# Patient Record
Sex: MALE | Race: WHITE | NOT HISPANIC OR LATINO | Employment: OTHER | URBAN - METROPOLITAN AREA
[De-identification: names, ages, dates, MRNs, and addresses within clinical notes are randomized per-mention and may not be internally consistent; named-entity substitution may affect disease eponyms.]

---

## 2018-03-22 ENCOUNTER — TRANSFERRED RECORDS (OUTPATIENT)
Dept: HEALTH INFORMATION MANAGEMENT | Facility: CLINIC | Age: 63
End: 2018-03-22

## 2022-05-11 ENCOUNTER — OFFICE VISIT (OUTPATIENT)
Dept: URGENT CARE | Facility: URGENT CARE | Age: 67
End: 2022-05-11
Payer: COMMERCIAL

## 2022-05-11 VITALS
WEIGHT: 195.31 LBS | TEMPERATURE: 98.5 F | DIASTOLIC BLOOD PRESSURE: 84 MMHG | SYSTOLIC BLOOD PRESSURE: 122 MMHG | HEART RATE: 77 BPM | OXYGEN SATURATION: 96 % | RESPIRATION RATE: 21 BRPM

## 2022-05-11 DIAGNOSIS — Z20.822 PERSON UNDER INVESTIGATION FOR COVID-19: Primary | ICD-10-CM

## 2022-05-11 DIAGNOSIS — J01.90 ACUTE NON-RECURRENT SINUSITIS, UNSPECIFIED LOCATION: ICD-10-CM

## 2022-05-11 LAB — SARS-COV-2 RNA RESP QL NAA+PROBE: NEGATIVE

## 2022-05-11 PROCEDURE — 99203 OFFICE O/P NEW LOW 30 MIN: CPT | Mod: CS | Performed by: PHYSICIAN ASSISTANT

## 2022-05-11 PROCEDURE — U0003 INFECTIOUS AGENT DETECTION BY NUCLEIC ACID (DNA OR RNA); SEVERE ACUTE RESPIRATORY SYNDROME CORONAVIRUS 2 (SARS-COV-2) (CORONAVIRUS DISEASE [COVID-19]), AMPLIFIED PROBE TECHNIQUE, MAKING USE OF HIGH THROUGHPUT TECHNOLOGIES AS DESCRIBED BY CMS-2020-01-R: HCPCS | Performed by: PHYSICIAN ASSISTANT

## 2022-05-11 PROCEDURE — U0005 INFEC AGEN DETEC AMPLI PROBE: HCPCS | Performed by: PHYSICIAN ASSISTANT

## 2022-05-11 RX ORDER — PRUCALOPRIDE 2 MG/1
1 TABLET, FILM COATED ORAL DAILY
COMMUNITY

## 2022-05-11 RX ORDER — BUPROPION HYDROCHLORIDE 150 MG/1
TABLET ORAL
COMMUNITY
Start: 2021-10-07

## 2022-05-11 RX ORDER — ALBUTEROL SULFATE 90 UG/1
2 AEROSOL, METERED RESPIRATORY (INHALATION)
COMMUNITY
Start: 2022-02-02

## 2022-05-11 RX ORDER — LANOLIN ALCOHOL/MO/W.PET/CERES
100 CREAM (GRAM) TOPICAL
COMMUNITY

## 2022-05-11 RX ORDER — LEVOTHYROXINE SODIUM 137 UG/1
1 TABLET ORAL DAILY
COMMUNITY
Start: 2021-10-18

## 2022-05-11 RX ORDER — LAMOTRIGINE 100 MG/1
TABLET ORAL
COMMUNITY
Start: 2022-02-14

## 2022-05-11 RX ORDER — BUSPIRONE HYDROCHLORIDE 10 MG/1
10 TABLET ORAL
COMMUNITY
Start: 2022-03-07 | End: 2023-03-07

## 2022-05-11 RX ORDER — CHLORAL HYDRATE 500 MG
2 CAPSULE ORAL
COMMUNITY

## 2022-05-11 RX ORDER — ROSUVASTATIN CALCIUM 5 MG/1
1 TABLET, COATED ORAL DAILY
COMMUNITY
Start: 2021-09-22

## 2022-05-11 RX ORDER — DULOXETIN HYDROCHLORIDE 60 MG/1
2 CAPSULE, DELAYED RELEASE ORAL
COMMUNITY
End: 2022-05-11

## 2022-05-11 RX ORDER — LOSARTAN POTASSIUM 25 MG/1
1 TABLET ORAL DAILY
COMMUNITY
Start: 2021-08-11

## 2022-05-11 RX ORDER — HYDROCHLOROTHIAZIDE 25 MG/1
0.5 TABLET ORAL DAILY
COMMUNITY
Start: 2021-09-10

## 2022-05-11 RX ORDER — GABAPENTIN 300 MG/1
1500 CAPSULE ORAL
COMMUNITY
Start: 2020-06-01

## 2022-05-11 RX ORDER — SILDENAFIL CITRATE 20 MG/1
TABLET ORAL
COMMUNITY
Start: 2022-01-18

## 2022-05-11 RX ORDER — TERBUTALINE SULFATE 5 MG/1
TABLET ORAL
COMMUNITY
Start: 2021-10-27

## 2022-05-11 RX ORDER — VILAZODONE HYDROCHLORIDE 40 MG/1
40 TABLET ORAL
COMMUNITY
Start: 2020-06-01

## 2022-05-11 RX ORDER — DILTIAZEM HYDROCHLORIDE 120 MG/1
CAPSULE, EXTENDED RELEASE ORAL
COMMUNITY
Start: 2021-08-30

## 2022-05-11 NOTE — PATIENT INSTRUCTIONS
1.  Plenty of fluids, rest, warm compresses on face  2.  Mucinex twice daily for at least 4 days  3.  Madeleine Pot 1x in the morning 1x at night (SALINE MIST SPRAY IS AN ACCEPTABLE, THOUGH NOT AS EFFECTIVE REPLACEMENT)  4.  Benadryl (diphenhydramine) at bedtime   5.  Either Claritin (Loratadine), Allegra (Fexofenadine), or Zyrtec (Cetirizine) in the day  6.  Flonase (Fluticasone) 2x each nostril twice a day for two weeks, then once each nostril once a day  7. Antibiotic twice daily for 7 Days  8. Probiotic (ie Culturelle) 1-2 hours after each antibiotic dose     Please let us know if symptoms persist, or worsen.      Patient Education     Self-Care for Sinusitis     Drinking plenty of water can help sinuses drain.   Sinusitis can often be managed with self-care. Self-care can keep sinuses moist and make you feel more comfortable. Remember to follow your doctor's instructions closely. This can make a big difference in getting your sinus problem under control.  Drink fluids  Drinking extra fluids helps thin your mucus. This lets it drain from your sinuses more easily. Have a glass of water every hour or two. A humidifier helps in much the same way. Fluids can also offset the drying effects of certain medicines. If you use a humidifier, follow the product maker's instructions on how to use it. Clean it on a regular schedule.  Use saltwater rinses  Rinses help keep your sinuses and nose moist. Mix a teaspoon of salt in 8 ounces of fresh, warm water. Use a bulb syringe to gently squirt the water into your nose a few times a day. You can also buy ready-made saline nasal sprays.  Apply hot or cold packs  Applying heat to the area surrounding your sinuses may make you feel more comfortable. Use a hot water bottle or a hand towel dipped in hot water. Some people also find ice packs effective for relieving pain.  Medicines  Your doctor may prescribe medications to help treat your sinusitis. If you have an infection,  antibiotics can help clear it up. If you are prescribed antibiotics, take all pills on schedule until they are gone, even if you feel better. Decongestants help relieve swelling. Use decongestant sprays for short periods only under the direction of your doctor. If you have allergies, your doctor may prescribe medications to help relieve them.   Date Last Reviewed: 10/1/2016    3761-7080 [employer]. [address] [place] [address], [place] [address]. All rights reserved. This information is not intended as a substitute for professional medical care. Always follow your healthcare professional's instructions.        Follow up immediately with severe headache, chest pain, or shortness of breath    Rest, isolate for results, hydrate, follow up if worsening or new symptoms  Unvaccinated household members to isolate until test results, if positive isolate for 2 weeks and follow up for testing if symptoms occur         Patient Education     Coronavirus Disease 2019 (COVID-19): Caring for Yourself or Others   If you or a household member have symptoms of COVID-19, follow the guidelines below. This will help you manage symptoms and keep the virus from spreading.  If you have symptoms of COVID-19  Stay home and contact your care team. They will tell you what to do.  Don t panic. Keep in mind that other illnesses can cause similar symptoms.  Stay away from work, school, and public places.  Limit physical contact with others in your home. Limit visitors. No kissing.  Clean surfaces you touch with disinfectant.  If you need to cough or sneeze, do it into a tissue. Then throw the tissue into the trash. If you don't have tissues, cough or sneeze into the bend of your elbow.  Don t share food or personal items with people in your household. This includes items like eating and drinking utensils, towels, and bedding.  Wear a cloth face mask around other people. During a public health emergency, medical face masks may be reserved  for healthcare workers. You may need to make a cloth face mask of your own. You can do this using a bandana, T-shirt, or other cloth. The [facility] has instructions on how to make a face mask. Wear the mask so that it covers both your nose and mouth.  If you need to go to a hospital or clinic, call ahead to let them know. Expect the care team to wear masks, gowns, gloves, and eye protection. You may need to come to a different entrance or wait in a separate room.  Follow all instructions from your care team.    If you ve been diagnosed with COVID-19  Stay home and away from others, including other people in your home. (This is called self-isolation.) Don t leave home unless you need to get medical care. Don t go to work, school, or public places. Don t use buses, taxis, or other public transportation.  Follow all instructions from your care team.  If you need to go to a hospital or clinic, call ahead to let them know. Expect the care team to wear masks, gowns, gloves, and eye protection. You may need to come to a different entrance or wait in a separate room.  Wear a face mask over your nose and mouth. This is to protect others from your germs. If you can t wear a mask, your caregivers should wear one. You may need to make your own mask using a bandana, T-shirt, or other cloth. See the CDC s instructions on how to make a face mask.  Have no contact with pets and other animals.  Don t share food or personal items with people in your household. This includes items like eating and drinking utensils, towels, and bedding.  If you need to cough or sneeze, do it into a tissue. Then throw the tissue into the trash. If you don't have tissues, cough or sneeze into the bend of your elbow.  Wash your hands often.    Self-care at home   At this time, there is no medicine approved to prevent or treat COVID-19. The FDA has approved an antiviral medicine (called remdesivir) for people being treated in the hospital. This is for people 12  years and older who weigh more than about 88 pounds (40 kgs). In certain cases, it may also be used for people younger than 12 years or who weigh less than about 88 pounds (40 kgs)..  Currently, treatment is mostly aimed at helping your body while it fights the virus.  Getting extra rest.  Drink extra fluids 6 to 8 glasses of liquids each day), unless a doctor has told you not to. Ask your care team which fluids are best for you. Avoid fluids that contain caffeine or alcohol.  Taking over-the-counter (OTC) medicine to reduce pain and fever. Your care team will tell you which medicine to use.  If you ve been in the hospital for COVID-19, follow your care team s instructions. They will tell you when to stop self-isolation. They may also have you change positions to help your breathing (such as lying on your belly).  If a test showed that you have COVID-19, you may be asked to donate plasma after you ve recovered. (This is called COVID-19 convalescent plasma donation.) The plasma may contain antibodies to help fight the virus in others. Experts don't know if the plasma will work well as a treatment. Research continues, and the FDA has approved it for emergency use in certain people with serious or life-threatening COVID-19. For more information, talk to your care team.  Caring for a sick person   Follow all instructions from the care team.  Wash your hands often.  Wear protective clothing as advised.  Make sure the sick person wears a mask. If they can't wear a mask, don t stay in the same room with them. If you must be in the same room, wear a face mask. Make sure the mask covers both the nose and mouth.  Keep track of the sick person s symptoms.  Clean surfaces often with disinfectant. This includes phones, kitchen counters, fridge door handles, bathroom surfaces, and others.  Don t let anyone share household items with the sick person. This includes eating and drinking tools, towels, sheets, or blankets.  Clean  fabrics and laundry well.  Keep other people and pets away from the sick person.    When you can stop self-isolation  When you are sick with COVID-19, you should stay away from other people. This is called self-isolation. The rules for ending self-isolation depend on your health in general.  If you are normally healthy:  You can stop self-isolation when all 3 of these are true:  You ve had no fever--and no medicine that reduces fever--for at least 24 hours. And   Your symptoms are better, such as cough or trouble breathing. And   At least 10 days have passed since your symptoms first started.  Talk with your care team before you leave home. They may tell you it s okay to leave, or they may give you different advice. You do not need to be re-tested.  If you have a weak immune system, or you ve had severe COVID-19:  Follow your care team s instructions. You may be asked to self-isolate for 10 days to 20 days after your symptoms first started. Your care team may want to re-test you for COVID-19.  Note: If you re being treated for cancer, have an immune disorder (such as HIV), or have had a transplant (organ or bone marrow), you may have a weak immune system.  If you've already had COVID-19 once:  If you had the virus over 3 months ago, and you ve been exposed again, treat it like you've never had COVID-19. Stay home, limit your contact with others, call your care team, and watch for symptoms.  If it s been less than 3 months since you had the virus, you re symptom-free, and you ve been exposed again: You don t need to self-isolate or be re-tested. But if you develop new symptoms that can t be linked to another illness, please self-isolate and contact your care team.  When you return to public settings  When you are well enough to go outside your home, follow the CDC s guidance on cloth face masks.  Anyone over age 2 should wear a face mask in public, especially when it's hard to socially distance. This includes public  transit, public protests and marches, and crowded stores, bars, and restaurants.  Face masks are most likely to reduce the spread of COVID-19 when they are widely used by people who are out in the public.  Certain people should not wear a face covering. These include:  Children under 2 years old  Anyone with a health, developmental, or mental health condition that can be made worse by wearing a mask  Anyone who is unconscious or unable to remove the face covering without help. See the CDC's guidance on who should not wear a face mask.  When to call your care team  Call your care team right away if a sick person has any of these:  Trouble breathing  Pain or pressure in chest  If a sick person has any of these, call 911:  Trouble breathing that gets worse  Pain or pressure in chest that gets worse  Blue tint to lips or face  Fast or irregular heartbeat  Confusion or trouble waking  Fainting or loss of consciousness  Coughing up blood  Going home from the hospital   If you have COVID-19 and were recently in the hospital:  Follow the instructions above for self-care and isolation.  Follow the hospital care team s instructions.  Ask questions if anything is unclear to you. Write down answers so you remember them.  Date last modified: 11/23/2020  Khadra last reviewed this educational content on 4/1/2020  This information has been modified by your health care provider with permission from the publisher.    [id number] [employer]. [address] [place] [address], [place] [address]. All rights reserved. This information is not intended as a substitute for professional medical care. Always follow your healthcare professional's instructions.

## 2022-05-11 NOTE — PROGRESS NOTES
SUBJECTIVE:  Alton Bronson is a 67 year old male here with concerns about sinus infection.  He states onset of symptoms were 1 day(s) ago.  He has had maxillary pressure. Course of illness is same. Severity moderate  Current and Associated symptoms: facial pain/pressure, tooth pain and headache  Predisposing factors include HX of recurrent sinusitis.   History of siinus surgery and sinusities      No past medical history on file.  Social History     Tobacco Use     Smoking status: Not on file     Smokeless tobacco: Not on file   Substance Use Topics     Alcohol use: Not on file       ROS:  Review of systems negative except as stated above.    OBJECTIVE:  /84 (BP Location: Right arm, Patient Position: Sitting, Cuff Size: Adult Regular)   Pulse 77   Temp 98.5  F (36.9  C) (Tympanic)   Resp 21   Wt 88.6 kg (195 lb 5 oz)   SpO2 96%   Exam:GENERAL APPEARANCE: healthy, alert and no distress  EYES: EOMI,  PERRL, conjunctiva clear  HENT: ear canals and TM's normal.  Nose and mouth without ulcers, erythema or lesions  NECK: supple, nontender, no lymphadenopathy  RESP: lungs clear to auscultation - no rales, rhonchi or wheezes  CV: regular rates and rhythm, normal S1 S2, no murmur noted  ABDOMEN:  soft, nontender, no HSM or masses and bowel sounds normal  NEURO: Normal strength and tone, sensory exam grossly normal,  normal speech and mentation  SKIN: no suspicious lesions or rashes      Results for orders placed or performed in visit on 05/11/22   Symptomatic; Yes; 5/11/2022 COVID-19 Virus (Coronavirus) by PCR Nose     Status: Normal    Specimen: Nose; Swab   Result Value Ref Range    SARS CoV2 PCR Negative Negative, Testing sent to reference lab. Results will be returned via unsolicited result    Narrative    Testing was performed using the Aptima SARS-CoV-2 Assay on the  [employer] [place] Instrument System. Additional information about this  Emergency Use Authorization (EUA) assay can be found via the Lab  Guide.  This test should be ordered for the detection of SARS-CoV-2 in  individuals who meet SARS-CoV-2 clinical and/or epidemiological  criteria. Test performance is unknown in asymptomatic patients. This  test is for in vitro diagnostic use under the FDA EUA for  laboratories certified under CLIA to perform high complexity testing.  This test has not been FDA cleared or approved. A negative result  does not rule out the presence of PCR inhibitors in the specimen or  target RNA in concentration below the limit of detection for the  assay. The possibility of a false negative should be considered if  the patient's recent exposure or clinical presentation suggests  COVID-19. This test was validated by the [facility]. This laboratory is certified under  the Clinical Laboratory Improvement Amendments of 1988 (CLIA-88) as  qualified to perform high complexity laboratory testing.       ASSESSMENT:  (Z20.822) Person under investigation for COVID-19  (primary encounter diagnosis)  (J01.90) Acute non-recurrent sinusitis, unspecified location  Plan: Symptomatic; Yes; 5/11/2022 COVID-19 Virus         (Coronavirus) by PCR Nose,         amoxicillin-clavulanate (AUGMENTIN) 875-125 MG         tablet      Covid-19  Pt was evaluated during a global COVID-19 pandemic, which necessitated consideration that the patient might be at risk for infection with the SARS-CoV-2 virus that causes COVID-19.   Applicable protocols for evaluation were followed during the patient's care.   COVID-19 was considered as part of the patient's evaluation. The plan for testing is:  a test was ordered during this visit.    No severe headache, chest pain, shortness of breath  No additional infectious symptoms  Rest, isolate for results, hydrate, follow up if worsening or new symptoms  Unvaccinated HH members to isolate until test results, if positive isolate for 2 weeks and follow up for testing if symptoms  occur  Red flags and emergent follow up discussed, and understood by patient  Follow up with PCP if symptoms worsen or fail to improve    Surgical mask, shield, gloves worn by provider      Patient Instructions         1.  Plenty of fluids, rest, warm compresses on face  2.  Mucinex twice daily for at least 4 days  3.  Madeleine Pot 1x in the morning 1x at night (SALINE MIST SPRAY IS AN ACCEPTABLE, THOUGH NOT AS EFFECTIVE REPLACEMENT)  4.  Benadryl (diphenhydramine) at bedtime   5.  Either Claritin (Loratadine), Allegra (Fexofenadine), or Zyrtec (Cetirizine) in the day  6.  Flonase (Fluticasone) 2x each nostril twice a day for two weeks, then once each nostril once a day  7. Antibiotic twice daily for 7 Days  8. Probiotic (ie Culturelle) 1-2 hours after each antibiotic dose     Please let us know if symptoms persist, or worsen.      Patient Education     Self-Care for Sinusitis     Drinking plenty of water can help sinuses drain.   Sinusitis can often be managed with self-care. Self-care can keep sinuses moist and make you feel more comfortable. Remember to follow your doctor's instructions closely. This can make a big difference in getting your sinus problem under control.  Drink fluids  Drinking extra fluids helps thin your mucus. This lets it drain from your sinuses more easily. Have a glass of water every hour or two. A humidifier helps in much the same way. Fluids can also offset the drying effects of certain medicines. If you use a humidifier, follow the product maker's instructions on how to use it. Clean it on a regular schedule.  Use saltwater rinses  Rinses help keep your sinuses and nose moist. Mix a teaspoon of salt in 8 ounces of fresh, warm water. Use a bulb syringe to gently squirt the water into your nose a few times a day. You can also buy ready-made saline nasal sprays.  Apply hot or cold packs  Applying heat to the area surrounding your sinuses may make you feel more comfortable. Use a hot water  bottle or a hand towel dipped in hot water. Some people also find ice packs effective for relieving pain.  Medicines  Your doctor may prescribe medications to help treat your sinusitis. If you have an infection, antibiotics can help clear it up. If you are prescribed antibiotics, take all pills on schedule until they are gone, even if you feel better. Decongestants help relieve swelling. Use decongestant sprays for short periods only under the direction of your doctor. If you have allergies, your doctor may prescribe medications to help relieve them.   Date Last Reviewed: 10/1/2016    0223-0296 [employer]. [address] [place] [address], [place] [address]. All rights reserved. This information is not intended as a substitute for professional medical care. Always follow your healthcare professional's instructions.        Follow up immediately with severe headache, chest pain, or shortness of breath    Rest, isolate for results, hydrate, follow up if worsening or new symptoms  Unvaccinated household members to isolate until test results, if positive isolate for 2 weeks and follow up for testing if symptoms occur         Patient Education     Coronavirus Disease 2019 (COVID-19): Caring for Yourself or Others   If you or a household member have symptoms of COVID-19, follow the guidelines below. This will help you manage symptoms and keep the virus from spreading.  If you have symptoms of COVID-19    Stay home and contact your care team. They will tell you what to do.    Don t panic. Keep in mind that other illnesses can cause similar symptoms.    Stay away from work, school, and public places.    Limit physical contact with others in your home. Limit visitors. No kissing.  Clean surfaces you touch with disinfectant.  If you need to cough or sneeze, do it into a tissue. Then throw the tissue into the trash. If you don't have tissues, cough or sneeze into the bend of your elbow.  Don t share food or personal items  with people in your household. This includes items like eating and drinking utensils, towels, and bedding.  Wear a cloth face mask around other people. During a public health emergency, medical face masks may be reserved for healthcare workers. You may need to make a cloth face mask of your own. You can do this using a bandana, T-shirt, or other cloth. The [facility] has instructions on how to make a face mask. Wear the mask so that it covers both your nose and mouth.  If you need to go to a hospital or clinic, call ahead to let them know. Expect the care team to wear masks, gowns, gloves, and eye protection. You may need to come to a different entrance or wait in a separate room.  Follow all instructions from your care team.    If you ve been diagnosed with COVID-19    Stay home and away from others, including other people in your home. (This is called self-isolation.) Don t leave home unless you need to get medical care. Don t go to work, school, or public places. Don t use buses, taxis, or other public transportation.    Follow all instructions from your care team.    If you need to go to a hospital or clinic, call ahead to let them know. Expect the care team to wear masks, gowns, gloves, and eye protection. You may need to come to a different entrance or wait in a separate room.    Wear a face mask over your nose and mouth. This is to protect others from your germs. If you can t wear a mask, your caregivers should wear one. You may need to make your own mask using a bandana, T-shirt, or other cloth. See the [facility] s instructions on how to make a face mask.    Have no contact with pets and other animals.    Don t share food or personal items with people in your household. This includes items like eating and drinking utensils, towels, and bedding.    If you need to cough or sneeze, do it into a tissue. Then throw the tissue into the trash. If you don't have tissues, cough or sneeze into the bend of your elbow.    Wash your  hands often.    Self-care at home   At this time, there is no medicine approved to prevent or treat COVID-19. The FDA has approved an antiviral medicine (called remdesivir) for people being treated in the hospital. This is for people 12 years and older who weigh more than about 88 pounds (40 kgs). In certain cases, it may also be used for people younger than 12 years or who weigh less than about 88 pounds (40 kgs)..  Currently, treatment is mostly aimed at helping your body while it fights the virus.    Getting extra rest.    Drink extra fluids 6 to 8 glasses of liquids each day), unless a doctor has told you not to. Ask your care team which fluids are best for you. Avoid fluids that contain caffeine or alcohol.    Taking over-the-counter (OTC) medicine to reduce pain and fever. Your care team will tell you which medicine to use.  If you ve been in the hospital for COVID-19, follow your care team s instructions. They will tell you when to stop self-isolation. They may also have you change positions to help your breathing (such as lying on your belly).  If a test showed that you have COVID-19, you may be asked to donate plasma after you ve recovered. (This is called COVID-19 convalescent plasma donation.) The plasma may contain antibodies to help fight the virus in others. Experts don't know if the plasma will work well as a treatment. Research continues, and the FDA has approved it for emergency use in certain people with serious or life-threatening COVID-19. For more information, talk to your care team.  Caring for a sick person     Follow all instructions from the care team.    Wash your hands often.    Wear protective clothing as advised.    Make sure the sick person wears a mask. If they can't wear a mask, don t stay in the same room with them. If you must be in the same room, wear a face mask. Make sure the mask covers both the nose and mouth.    Keep track of the sick person s symptoms.    Clean surfaces often  with disinfectant. This includes phones, kitchen counters, fridge door handles, bathroom surfaces, and others.    Don t let anyone share household items with the sick person. This includes eating and drinking tools, towels, sheets, or blankets.    Clean fabrics and laundry well.    Keep other people and pets away from the sick person.    When you can stop self-isolation  When you are sick with COVID-19, you should stay away from other people. This is called self-isolation. The rules for ending self-isolation depend on your health in general.  If you are normally healthy:  You can stop self-isolation when all 3 of these are true:    You ve had no fever--and no medicine that reduces fever--for at least 24 hours. And     Your symptoms are better, such as cough or trouble breathing. And     At least 10 days have passed since your symptoms first started.  Talk with your care team before you leave home. They may tell you it s okay to leave, or they may give you different advice. You do not need to be re-tested.  If you have a weak immune system, or you ve had severe COVID-19:  Follow your care team s instructions. You may be asked to self-isolate for 10 days to 20 days after your symptoms first started. Your care team may want to re-test you for COVID-19.  Note: If you re being treated for cancer, have an immune disorder (such as HIV), or have had a transplant (organ or bone marrow), you may have a weak immune system.  If you've already had COVID-19 once:  If you had the virus over 3 months ago, and you ve been exposed again, treat it like you've never had COVID-19. Stay home, limit your contact with others, call your care team, and watch for symptoms.  If it s been less than 3 months since you had the virus, you re symptom-free, and you ve been exposed again: You don t need to self-isolate or be re-tested. But if you develop new symptoms that can t be linked to another illness, please self-isolate and contact your care  team.  When you return to public settings  When you are well enough to go outside your home, follow the CDC s guidance on cloth face masks.    Anyone over age 2 should wear a face mask in public, especially when it's hard to socially distance. This includes public transit, public protests and marches, and crowded stores, bars, and restaurants.    Face masks are most likely to reduce the spread of COVID-19 when they are widely used by people who are out in the public.  Certain people should not wear a face covering. These include:    Children under 2 years old    Anyone with a health, developmental, or mental health condition that can be made worse by wearing a mask    Anyone who is unconscious or unable to remove the face covering without help. See the CDC's guidance on who should not wear a face mask.  When to call your care team  Call your care team right away if a sick person has any of these:    Trouble breathing    Pain or pressure in chest  If a sick person has any of these, call 911:    Trouble breathing that gets worse    Pain or pressure in chest that gets worse    Blue tint to lips or face    Fast or irregular heartbeat    Confusion or trouble waking    Fainting or loss of consciousness    Coughing up blood  Going home from the hospital   If you have COVID-19 and were recently in the hospital:    Follow the instructions above for self-care and isolation.    Follow the hospital care team s instructions.    Ask questions if anything is unclear to you. Write down answers so you remember them.  Date last modified: 11/23/2020  Khadra last reviewed this educational content on 4/1/2020  This information has been modified by your health care provider with permission from the publisher.    2333-9499 [employer]. [address] [place] [address], [place] [address]. All rights reserved. This information is not intended as a substitute for professional medical care. Always follow your healthcare professional's  instructions.

## 2022-11-23 RX ORDER — FUROSEMIDE 20 MG
20 TABLET ORAL DAILY
COMMUNITY

## 2022-11-23 RX ORDER — SENNOSIDES A AND B 8.6 MG/1
1 TABLET, FILM COATED ORAL DAILY
COMMUNITY

## 2022-11-23 RX ORDER — OMEGA-3 FATTY ACIDS/FISH OIL 300-1000MG
200 CAPSULE ORAL EVERY 4 HOURS PRN
Status: ON HOLD | COMMUNITY
End: 2022-11-28

## 2022-11-23 RX ORDER — TADALAFIL 5 MG/1
5 TABLET ORAL EVERY 24 HOURS
COMMUNITY

## 2022-11-23 RX ORDER — FAMOTIDINE 20 MG/1
20 TABLET, FILM COATED ORAL 2 TIMES DAILY
COMMUNITY

## 2022-11-27 ENCOUNTER — ANESTHESIA EVENT (OUTPATIENT)
Dept: SURGERY | Facility: CLINIC | Age: 67
End: 2022-11-27
Payer: COMMERCIAL

## 2022-11-27 ASSESSMENT — LIFESTYLE VARIABLES: TOBACCO_USE: 1

## 2022-11-27 NOTE — ANESTHESIA PREPROCEDURE EVALUATION
Anesthesia Pre-Procedure Evaluation    Patient: Alton Bronson   MRN: [id number] : 1955        Procedure : Procedure(s):  RIGHT FIRST METATARSOPHALANGEAL JOINT ARTHRODESIS,  RIGHT BUNIONETTE REPAIR          Past Medical History:   Diagnosis Date     ADD (attention deficit disorder)      Anxiety      Depression      Gastroesophageal reflux disease with esophagitis      Gastroparesis      Hypertension      Osteoarthritis      Pleural effusion      Uncomplicated asthma       Past Surgical History:   Procedure Laterality Date     GASTRIC FUNDOPLICATION       HERNIA REPAIR       SEPTOPLASTY       TONSILLECTOMY        Allergies   Allergen Reactions     Lisinopril      Allergist suggested lisinopril might be adding to his cough every2018.  Started losartan in its place but could try lisinopril again in the future as a trial if desired.     Simvastatin      PN: LW Reaction: myalgias at 80mg      Social History     Tobacco Use     Smoking status: Not on file     Smokeless tobacco: Not on file   Substance Use Topics     Alcohol use: Not on file      Wt Readings from Last 1 Encounters:   22 88.6 kg (195 lb 5 oz)        Anesthesia Evaluation   Pt has had prior anesthetic.     No history of anesthetic complications       ROS/MED HX  ENT/Pulmonary: Comment: S/p left pleurodesis for pleural effusions    Chronic pansinusitis    Baseline oxygen saturations are 92% on room air.    (+) tobacco use, asthma     Neurologic: Comment: ADD      Cardiovascular: Comment:  Stress Test  STRESS ECHOCARDIOGRAM.   Date: 2014 Start: 09:09 AM Facility: [facility]     CONCLUSIONS   REST:   LV chamber size, wall thickness, and segmental and global wall motion   are normal. No significant valvular abnormalities are seen.   The visually estimated resting LVEF is 60 %. Size of the aortic sinus   valsalva is slightly increased. (3.9 cm)     STRESS:   All segments display appropriate hyperkinesis; ejection fraction   increases  appropriately.   End systolic area decreases.     CONCLUSIONS:   Normal exercise echocardiogram with adequate heart rate and workload.   No evidence for inducible ischemia.   Risk stratification by stress echocardiography is identified as low   risk based on normal stress echocardiogram.      (+) Dyslipidemia hypertension-----    METS/Exercise Tolerance:     Hematologic: Comments: Hgb 13.8/Plt 388      Musculoskeletal:   (+) arthritis,     GI/Hepatic: Comment: H/o gastroparesis    S/p nissen    H/o diverticular disease        (+) GERD,     Renal/Genitourinary:  - neg Renal ROS     Endo:     (+) thyroid problem, hypothyroidism,     Psychiatric/Substance Use:     (+) psychiatric history anxiety and depression     Infectious Disease:  - neg infectious disease ROS     Malignancy:  - neg malignancy ROS     Other:  - neg other ROS          Physical Exam    Airway  airway exam normal      Mallampati: II   TM distance: > 3 FB   Neck ROM: full   Mouth opening: > 3 cm    Respiratory Devices and Support         Dental  no notable dental history         Cardiovascular   cardiovascular exam normal          Pulmonary   pulmonary exam normal                OUTSIDE LABS:  CBC: No results found for: WBC, HGB, HCT, PLT  BMP: No results found for: NA, POTASSIUM, CHLORIDE, CO2, BUN, CR, GLC  COAGS: No results found for: PTT, INR, FIBR  POC: No results found for: BGM, HCG, HCGS  HEPATIC: No results found for: ALBUMIN, PROTTOTAL, ALT, AST, GGT, ALKPHOS, BILITOTAL, BILIDIRECT, RODRIGUEZ  OTHER: No results found for: PH, LACT, A1C, MICHAEL, PHOS, MAG, LIPASE, AMYLASE, TSH, T4, T3, CRP, SED    Anesthesia Plan    ASA Status:  3      Anesthesia Type: General.     - Airway: ETT   Induction: Intravenous.   Maintenance: Balanced.   Techniques and Equipment:     - Airway: Video-Laryngoscope         Consents    Anesthesia Plan(s) and associated risks, benefits, and realistic alternatives discussed. Questions answered and patient/representative(s)  expressed understanding.    - Discussed:     - Discussed with:  Patient         Postoperative Care    Pain management: IV analgesics, Peripheral nerve block (Continuous), Multi-modal analgesia.   PONV prophylaxis: Ondansetron (or other 5HT-3), Dexamethasone or Solumedrol     Comments:    Other Comments: Discussed risks/benefits of popliteal/adductor nerve block with patient. Patient would like blocks for post-op pain.            Antonio Luciano MD

## 2022-11-28 ENCOUNTER — APPOINTMENT (OUTPATIENT)
Dept: GENERAL RADIOLOGY | Facility: CLINIC | Age: 67
End: 2022-11-28
Attending: ORTHOPAEDIC SURGERY
Payer: COMMERCIAL

## 2022-11-28 ENCOUNTER — HOSPITAL ENCOUNTER (OUTPATIENT)
Facility: CLINIC | Age: 67
Discharge: HOME | End: 2022-11-28
Attending: ORTHOPAEDIC SURGERY | Admitting: ORTHOPAEDIC SURGERY
Payer: COMMERCIAL

## 2022-11-28 ENCOUNTER — ANESTHESIA (OUTPATIENT)
Dept: SURGERY | Facility: CLINIC | Age: 67
End: 2022-11-28
Payer: COMMERCIAL

## 2022-11-28 VITALS
WEIGHT: 191.6 LBS | DIASTOLIC BLOOD PRESSURE: 96 MMHG | SYSTOLIC BLOOD PRESSURE: 132 MMHG | OXYGEN SATURATION: 93 % | HEART RATE: 88 BPM | TEMPERATURE: 97 F | RESPIRATION RATE: 12 BRPM | BODY MASS INDEX: 25.39 KG/M2 | HEIGHT: 73 IN

## 2022-11-28 DIAGNOSIS — M20.21 HALLUX RIGIDUS OF RIGHT FOOT: Primary | ICD-10-CM

## 2022-11-28 LAB — POTASSIUM BLD-SCNC: 3.5 MMOL/L (ref 3.4–5.3)

## 2022-11-28 PROCEDURE — 250N000013 HC RX MED GY IP 250 OP 250 PS 637: Performed by: PHYSICIAN ASSISTANT

## 2022-11-28 PROCEDURE — 250N000011 HC RX IP 250 OP 636: Performed by: ANESTHESIOLOGY

## 2022-11-28 PROCEDURE — 999N000141 HC STATISTIC PRE-PROCEDURE NURSING ASSESSMENT: Performed by: ORTHOPAEDIC SURGERY

## 2022-11-28 PROCEDURE — 250N000009 HC RX 250: Performed by: ORTHOPAEDIC SURGERY

## 2022-11-28 PROCEDURE — 250N000011 HC RX IP 250 OP 636: Performed by: NURSE ANESTHETIST, CERTIFIED REGISTERED

## 2022-11-28 PROCEDURE — 360N000083 HC SURGERY LEVEL 3 W/ FLUORO, PER MIN: Performed by: ORTHOPAEDIC SURGERY

## 2022-11-28 PROCEDURE — 258N000003 HC RX IP 258 OP 636: Performed by: NURSE ANESTHETIST, CERTIFIED REGISTERED

## 2022-11-28 PROCEDURE — 84132 ASSAY OF SERUM POTASSIUM: CPT | Performed by: ANESTHESIOLOGY

## 2022-11-28 PROCEDURE — 272N000001 HC OR GENERAL SUPPLY STERILE: Performed by: ORTHOPAEDIC SURGERY

## 2022-11-28 PROCEDURE — 250N000011 HC RX IP 250 OP 636: Performed by: PHYSICIAN ASSISTANT

## 2022-11-28 PROCEDURE — 258N000003 HC RX IP 258 OP 636: Performed by: ANESTHESIOLOGY

## 2022-11-28 PROCEDURE — 710N000012 HC RECOVERY PHASE 2, PER MINUTE: Performed by: ORTHOPAEDIC SURGERY

## 2022-11-28 PROCEDURE — 271N000001 HC OR GENERAL SUPPLY NON-STERILE: Performed by: ORTHOPAEDIC SURGERY

## 2022-11-28 PROCEDURE — 710N000009 HC RECOVERY PHASE 1, LEVEL 1, PER MIN: Performed by: ORTHOPAEDIC SURGERY

## 2022-11-28 PROCEDURE — 999N000179 XR SURGERY CARM FLUORO LESS THAN 5 MIN W STILLS

## 2022-11-28 PROCEDURE — 250N000009 HC RX 250: Performed by: NURSE ANESTHETIST, CERTIFIED REGISTERED

## 2022-11-28 PROCEDURE — 370N000017 HC ANESTHESIA TECHNICAL FEE, PER MIN: Performed by: ORTHOPAEDIC SURGERY

## 2022-11-28 PROCEDURE — 250N000025 HC SEVOFLURANE, PER MIN: Performed by: ORTHOPAEDIC SURGERY

## 2022-11-28 PROCEDURE — C1713 ANCHOR/SCREW BN/BN,TIS/BN: HCPCS | Performed by: ORTHOPAEDIC SURGERY

## 2022-11-28 DEVICE — IMPLANTABLE DEVICE: Type: IMPLANTABLE DEVICE | Site: FOOT | Status: FUNCTIONAL

## 2022-11-28 RX ORDER — HYDROCODONE BITARTRATE AND ACETAMINOPHEN 5; 325 MG/1; MG/1
1-2 TABLET ORAL EVERY 4 HOURS PRN
Qty: 30 TABLET | Refills: 0 | Status: SHIPPED | OUTPATIENT
Start: 2022-11-28

## 2022-11-28 RX ORDER — EPHEDRINE SULFATE 50 MG/ML
INJECTION, SOLUTION INTRAMUSCULAR; INTRAVENOUS; SUBCUTANEOUS PRN
Status: DISCONTINUED | OUTPATIENT
Start: 2022-11-28 | End: 2022-11-28

## 2022-11-28 RX ORDER — MAGNESIUM HYDROXIDE 1200 MG/15ML
LIQUID ORAL PRN
Status: DISCONTINUED | OUTPATIENT
Start: 2022-11-28 | End: 2022-11-28 | Stop reason: HOSPADM

## 2022-11-28 RX ORDER — LIDOCAINE HYDROCHLORIDE 20 MG/ML
INJECTION, SOLUTION INFILTRATION; PERINEURAL PRN
Status: DISCONTINUED | OUTPATIENT
Start: 2022-11-28 | End: 2022-11-28

## 2022-11-28 RX ORDER — FENTANYL CITRATE 0.05 MG/ML
50 INJECTION, SOLUTION INTRAMUSCULAR; INTRAVENOUS
Status: COMPLETED | OUTPATIENT
Start: 2022-11-28 | End: 2022-11-28

## 2022-11-28 RX ORDER — GLYCOPYRROLATE 0.2 MG/ML
INJECTION, SOLUTION INTRAMUSCULAR; INTRAVENOUS PRN
Status: DISCONTINUED | OUTPATIENT
Start: 2022-11-28 | End: 2022-11-28

## 2022-11-28 RX ORDER — IBUPROFEN 800 MG/1
800 TABLET, FILM COATED ORAL EVERY 8 HOURS PRN
Qty: 50 TABLET | Refills: 0 | Status: SHIPPED | OUTPATIENT
Start: 2022-11-28

## 2022-11-28 RX ORDER — ROPIVACAINE HYDROCHLORIDE 5 MG/ML
INJECTION, SOLUTION EPIDURAL; INFILTRATION; PERINEURAL
Status: COMPLETED | OUTPATIENT
Start: 2022-11-28 | End: 2022-11-28

## 2022-11-28 RX ORDER — FENTANYL CITRATE 0.05 MG/ML
25 INJECTION, SOLUTION INTRAMUSCULAR; INTRAVENOUS
Status: CANCELLED | OUTPATIENT
Start: 2022-11-28

## 2022-11-28 RX ORDER — IBUPROFEN 600 MG/1
600 TABLET, FILM COATED ORAL
Status: DISCONTINUED | OUTPATIENT
Start: 2022-11-28 | End: 2022-11-28 | Stop reason: HOSPADM

## 2022-11-28 RX ORDER — SODIUM CHLORIDE, SODIUM LACTATE, POTASSIUM CHLORIDE, CALCIUM CHLORIDE 600; 310; 30; 20 MG/100ML; MG/100ML; MG/100ML; MG/100ML
INJECTION, SOLUTION INTRAVENOUS CONTINUOUS
Status: DISCONTINUED | OUTPATIENT
Start: 2022-11-28 | End: 2022-11-28 | Stop reason: HOSPADM

## 2022-11-28 RX ORDER — HYDROXYZINE HYDROCHLORIDE 10 MG/1
10 TABLET, FILM COATED ORAL
Status: DISCONTINUED | OUTPATIENT
Start: 2022-11-28 | End: 2022-11-28 | Stop reason: HOSPADM

## 2022-11-28 RX ORDER — ONDANSETRON 2 MG/ML
INJECTION INTRAMUSCULAR; INTRAVENOUS PRN
Status: DISCONTINUED | OUTPATIENT
Start: 2022-11-28 | End: 2022-11-28

## 2022-11-28 RX ORDER — HYDROXYZINE HYDROCHLORIDE 25 MG/1
25 TABLET, FILM COATED ORAL 3 TIMES DAILY PRN
Qty: 20 TABLET | Refills: 0 | Status: SHIPPED | OUTPATIENT
Start: 2022-11-28

## 2022-11-28 RX ORDER — CEFAZOLIN SODIUM/WATER 2 G/20 ML
2 SYRINGE (ML) INTRAVENOUS SEE ADMIN INSTRUCTIONS
Status: DISCONTINUED | OUTPATIENT
Start: 2022-11-28 | End: 2022-11-28 | Stop reason: HOSPADM

## 2022-11-28 RX ORDER — FENTANYL CITRATE 50 UG/ML
25 INJECTION, SOLUTION INTRAMUSCULAR; INTRAVENOUS EVERY 5 MIN PRN
Status: DISCONTINUED | OUTPATIENT
Start: 2022-11-28 | End: 2022-11-28 | Stop reason: HOSPADM

## 2022-11-28 RX ORDER — ONDANSETRON 2 MG/ML
4 INJECTION INTRAMUSCULAR; INTRAVENOUS EVERY 30 MIN PRN
Status: DISCONTINUED | OUTPATIENT
Start: 2022-11-28 | End: 2022-11-28 | Stop reason: HOSPADM

## 2022-11-28 RX ORDER — HYDROCODONE BITARTRATE AND ACETAMINOPHEN 5; 325 MG/1; MG/1
1 TABLET ORAL
Status: COMPLETED | OUTPATIENT
Start: 2022-11-28 | End: 2022-11-28

## 2022-11-28 RX ORDER — FENTANYL CITRATE 50 UG/ML
50 INJECTION, SOLUTION INTRAMUSCULAR; INTRAVENOUS EVERY 5 MIN PRN
Status: DISCONTINUED | OUTPATIENT
Start: 2022-11-28 | End: 2022-11-28 | Stop reason: HOSPADM

## 2022-11-28 RX ORDER — HYDROMORPHONE HCL IN WATER/PF 6 MG/30 ML
0.4 PATIENT CONTROLLED ANALGESIA SYRINGE INTRAVENOUS EVERY 5 MIN PRN
Status: DISCONTINUED | OUTPATIENT
Start: 2022-11-28 | End: 2022-11-28 | Stop reason: HOSPADM

## 2022-11-28 RX ORDER — MEPERIDINE HYDROCHLORIDE 25 MG/ML
12.5 INJECTION INTRAMUSCULAR; INTRAVENOUS; SUBCUTANEOUS
Status: DISCONTINUED | OUTPATIENT
Start: 2022-11-28 | End: 2022-11-28 | Stop reason: HOSPADM

## 2022-11-28 RX ORDER — LIDOCAINE 40 MG/G
CREAM TOPICAL
Status: DISCONTINUED | OUTPATIENT
Start: 2022-11-28 | End: 2022-11-28 | Stop reason: HOSPADM

## 2022-11-28 RX ORDER — HYDROMORPHONE HCL IN WATER/PF 6 MG/30 ML
0.2 PATIENT CONTROLLED ANALGESIA SYRINGE INTRAVENOUS EVERY 5 MIN PRN
Status: DISCONTINUED | OUTPATIENT
Start: 2022-11-28 | End: 2022-11-28 | Stop reason: HOSPADM

## 2022-11-28 RX ORDER — PROPOFOL 10 MG/ML
INJECTION, EMULSION INTRAVENOUS PRN
Status: DISCONTINUED | OUTPATIENT
Start: 2022-11-28 | End: 2022-11-28

## 2022-11-28 RX ORDER — CEFAZOLIN SODIUM/WATER 2 G/20 ML
2 SYRINGE (ML) INTRAVENOUS
Status: COMPLETED | OUTPATIENT
Start: 2022-11-28 | End: 2022-11-28

## 2022-11-28 RX ORDER — ONDANSETRON 4 MG/1
4 TABLET, ORALLY DISINTEGRATING ORAL EVERY 30 MIN PRN
Status: DISCONTINUED | OUTPATIENT
Start: 2022-11-28 | End: 2022-11-28 | Stop reason: HOSPADM

## 2022-11-28 RX ORDER — DEXAMETHASONE SODIUM PHOSPHATE 4 MG/ML
INJECTION, SOLUTION INTRA-ARTICULAR; INTRALESIONAL; INTRAMUSCULAR; INTRAVENOUS; SOFT TISSUE PRN
Status: DISCONTINUED | OUTPATIENT
Start: 2022-11-28 | End: 2022-11-28

## 2022-11-28 RX ADMIN — ONDANSETRON 4 MG: 2 INJECTION INTRAMUSCULAR; INTRAVENOUS at 07:57

## 2022-11-28 RX ADMIN — GLYCOPYRROLATE 0.1 MG: 0.2 INJECTION, SOLUTION INTRAMUSCULAR; INTRAVENOUS at 07:42

## 2022-11-28 RX ADMIN — FENTANYL CITRATE 50 MCG: 50 INJECTION, SOLUTION INTRAMUSCULAR; INTRAVENOUS at 07:21

## 2022-11-28 RX ADMIN — PHENYLEPHRINE HYDROCHLORIDE 100 MCG: 10 INJECTION INTRAVENOUS at 08:17

## 2022-11-28 RX ADMIN — SODIUM CHLORIDE, POTASSIUM CHLORIDE, SODIUM LACTATE AND CALCIUM CHLORIDE: 600; 310; 30; 20 INJECTION, SOLUTION INTRAVENOUS at 06:49

## 2022-11-28 RX ADMIN — PHENYLEPHRINE HYDROCHLORIDE 100 MCG: 10 INJECTION INTRAVENOUS at 08:34

## 2022-11-28 RX ADMIN — PROPOFOL 200 MG: 10 INJECTION, EMULSION INTRAVENOUS at 07:37

## 2022-11-28 RX ADMIN — FENTANYL CITRATE 50 MCG: 50 INJECTION, SOLUTION INTRAMUSCULAR; INTRAVENOUS at 07:37

## 2022-11-28 RX ADMIN — HYDROCODONE BITARTRATE AND ACETAMINOPHEN 1 TABLET: 5; 325 TABLET ORAL at 09:47

## 2022-11-28 RX ADMIN — ROPIVACAINE HYDROCHLORIDE 20 ML: 5 INJECTION, SOLUTION EPIDURAL; INFILTRATION; PERINEURAL at 07:20

## 2022-11-28 RX ADMIN — Medication 2 G: at 07:27

## 2022-11-28 RX ADMIN — LIDOCAINE HYDROCHLORIDE 100 MG: 20 INJECTION, SOLUTION INFILTRATION; PERINEURAL at 07:37

## 2022-11-28 RX ADMIN — MIDAZOLAM HYDROCHLORIDE 2 MG: 1 INJECTION, SOLUTION INTRAMUSCULAR; INTRAVENOUS at 07:27

## 2022-11-28 RX ADMIN — Medication 10 MG: at 07:40

## 2022-11-28 RX ADMIN — SODIUM CHLORIDE, POTASSIUM CHLORIDE, SODIUM LACTATE AND CALCIUM CHLORIDE: 600; 310; 30; 20 INJECTION, SOLUTION INTRAVENOUS at 09:44

## 2022-11-28 RX ADMIN — MIDAZOLAM HYDROCHLORIDE 1 MG: 1 INJECTION, SOLUTION INTRAMUSCULAR; INTRAVENOUS at 07:21

## 2022-11-28 RX ADMIN — SUCCINYLCHOLINE CHLORIDE 140 MG: 20 INJECTION, SOLUTION INTRAMUSCULAR; INTRAVENOUS; PARENTERAL at 07:37

## 2022-11-28 RX ADMIN — PHENYLEPHRINE HYDROCHLORIDE 100 MCG: 10 INJECTION INTRAVENOUS at 07:59

## 2022-11-28 RX ADMIN — DEXAMETHASONE SODIUM PHOSPHATE 4 MG: 4 INJECTION, SOLUTION INTRA-ARTICULAR; INTRALESIONAL; INTRAMUSCULAR; INTRAVENOUS; SOFT TISSUE at 07:54

## 2022-11-28 RX ADMIN — DEXMEDETOMIDINE HYDROCHLORIDE 8 MCG: 100 INJECTION, SOLUTION INTRAVENOUS at 08:52

## 2022-11-28 RX ADMIN — ROPIVACAINE HYDROCHLORIDE 10 ML: 5 INJECTION, SOLUTION EPIDURAL; INFILTRATION; PERINEURAL at 07:25

## 2022-11-28 RX ADMIN — Medication 5 MG: at 07:55

## 2022-11-28 RX ADMIN — GLYCOPYRROLATE 0.1 MG: 0.2 INJECTION, SOLUTION INTRAMUSCULAR; INTRAVENOUS at 07:55

## 2022-11-28 RX ADMIN — Medication 10 MG: at 07:43

## 2022-11-28 ASSESSMENT — ACTIVITIES OF DAILY LIVING (ADL)
ADLS_ACUITY_SCORE: 35
ADLS_ACUITY_SCORE: 35

## 2022-11-28 NOTE — ANESTHESIA CARE TRANSFER NOTE
Patient: Alton Bronson    Procedure: Procedure(s):  RIGHT FIRST METATARSOPHALANGEAL JOINT ARTHRODESIS,  RIGHT BUNIONETTE REPAIR       Diagnosis: Arthritis of right ankle [M19.071]  Diagnosis Additional Information: No value filed.    Anesthesia Type:   General     Note:    Oropharynx: oropharynx clear of all foreign objects and spontaneously breathing  Level of Consciousness: awake  Oxygen Supplementation: face mask  Level of Supplemental Oxygen (L/min / FiO2): 6  Independent Airway: airway patency satisfactory and stable  Dentition: dentition unchanged  Vital Signs Stable: post-procedure vital signs reviewed and stable  Report to RN Given: handoff report given  Patient transferred to: PACU    Handoff Report: Identifed the Patient, Identified the Reponsible Provider, Reviewed the pertinent medical history, Discussed the surgical course, Reviewed Intra-OP anesthesia mangement and issues during anesthesia, Set expectations for post-procedure period and Allowed opportunity for questions and acknowledgement of understanding      Vitals:  Vitals Value Taken Time   /98 11/28/22 0901   Temp     Pulse 91 11/28/22 0904   Resp 17 11/28/22 0904   SpO2 96 % 11/28/22 0904   Vitals shown include unvalidated device data.    Electronically Signed By: YVETTE Eastman CRNA  November 28, 2022  9:06 AM

## 2022-11-28 NOTE — OP NOTE
[facility]   Operative Note    Pre-operative diagnosis: 1.  Right hallux rigidus  2.  Right foot bunionette deformity   Post-operative diagnosis Same   Procedure: 1.  Right first metatarsophalangeal joint arthrodesis  2.  Right first proximal phalanx Akin osteotomy  3.  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  A long discussion was had regarding conservative treatment versus surgical management.  We did discuss the risks of surgery.  We also discussed the postoperative rehabilitation protocol.  After discussing all the details of the surgery and failing conservative treatment measures he elected to undergo the above-mentioned procedures.    Description of procedure:   The patient was met in the preoperative area and the operative site, the right foot, was signed by myself.  Preoperative antibiotics were given.  Patient was then brought back to the operating room and was placed in the supine position on the operating table.  All bony prominences were padded at this time.  He then underwent general anesthesia.  He was then prepped and draped in normal sterile fashion.  A timeout was then called ensuring we are operating on the correct site and the correct patient.  All staff concerns were addressed this time.  Following the timeout the right lower extremity was elevated and a thigh tourniquet was placed at 300  mmHg.    An incision was made over the dorsal aspect of the first MTP joint dissection was carried down through the skin and subcutaneous tissue.  Hemostasis was achieved.  We then identified the EHL tendon and this was retracted laterally.  We then performed our capsulotomy.  Then placed a guidepin into the center of the metatarsal head and used cup and cone reamers in order to prep the joint.  We removed any remaining osteophytes using a rongeur.  Prior to prepping the joints there was full-thickness cartilage loss on both sides of the joint and substantial osteophytes dorsally.  Once we had successfully prepped the joint using a 2.0 mm drill we placed a K wire across the joint in order to hold our reduction.  We brought in C arm and confirmed the positioning of the toe.  At this point we placed a [place] 28 dorsal first MTP fusion plate on the first MTP joint.  We confirmed our position via fluoroscopy.  We drilled and placed screws into the proximal phalanx and then compressed through the plate.  We had excellent compression.  At this point it was noted that we had a slight hallux valgus interphalange ES and thus an [place] osteotomy was performed and a [place] 28 staple was used in order to hold the osteotomy reduced.  There was still in a slight skin contact between the first and second toes this was due to the deviation of the second toe and thus we elected to leave everything alone at this point.    We then made a separate incision over the lateral aspect of the fifth metatarsal and dissection was carried down through the skin and subcutaneous tissue.  Capsulotomy was then performed.  We then made an oblique style osteotomy and translated the metatarsal head medially.  We then used a point-to-point reduction clamp in order to reduce her osteotomy and drilled and placed two 2.0 millimeter screws across her osteotomy site holding excellent compression.  We then removed the remaining metaphyseal flare.  We then  closed the capsule using 3-0 Monocryl suture.  3-0 nylon suture was used to close the skin of both of our skin incisions.  We then brought in C arm and took final x-rays demonstrating an attempted arthrodesis and improvement of the bunionette deformity.  Hardware was in appropriate position.  Sterile bandages were placed and the patient and he was transferred off of the operating table and brought back to the postanesthesia care unit where he woke without any difficulty.    All counts correct at the end of the case.    Postoperative plan: Patient be weightbearing as tolerated on the right lower extremity.  He will be seen in my clinic in 2 weeks for suture removal.  At that time he will continue to be weightbearing as tolerated in a postoperative shoe for a total of 6 weeks.

## 2022-11-28 NOTE — ANESTHESIA PROCEDURE NOTES
Airway       Patient location during procedure: OR       Procedure Start/Stop Times: 11/28/2022 7:39 AM  Staff -        CRNA: Kathy Thomas APRN CRNA       Performed By: CRNA  Consent for Airway        Urgency: elective  Indications and Patient Condition       Indications for airway management: bernardo-procedural       Induction type:RSI       Mask difficulty assessment: 0 - not attempted    Final Airway Details       Final airway type: endotracheal airway       Successful airway: ETT - single  Endotracheal Airway Details        ETT size (mm): 8.0       Cuffed: yes       Successful intubation technique: video laryngoscopy       VL Blade Size: Glidescope 4       Grade View of Cords: 1       Adjucts: stylet       Position: Right       Measured from: gums/teeth       Secured at (cm): 22       Bite block used: None    Post intubation assessment        Placement verified by: capnometry, equal breath sounds and chest rise        Number of attempts at approach: 1       Number of other approaches attempted: 0       Secured with: pink tape       Ease of procedure: easy       Dentition: Intact    Medication(s) Administered   Medication Administration Time: 11/28/2022 7:39 AM

## 2022-11-28 NOTE — ANESTHESIA POSTPROCEDURE EVALUATION
Patient: Alton Bronson    Procedure: Procedure(s):  RIGHT FIRST METATARSOPHALANGEAL JOINT ARTHRODESIS,  RIGHT BUNIONETTE REPAIR       Anesthesia Type:  General    Note:  Disposition: Outpatient   Postop Pain Control: Uneventful            Sign Out: Well controlled pain   PONV: No   Neuro/Psych: Uneventful            Sign Out: Acceptable/Baseline neuro status   Airway/Respiratory: Uneventful            Sign Out: Acceptable/Baseline resp. status   CV/Hemodynamics: Uneventful            Sign Out: Acceptable CV status; No obvious hypovolemia; No obvious fluid overload   Other NRE: NONE   DID A NON-ROUTINE EVENT OCCUR? No           Last vitals:  Vitals Value Taken Time   /97 11/28/22 0930   Temp     Pulse 90 11/28/22 0941   Resp 15 11/28/22 0941   SpO2 92 % 11/28/22 0941   Vitals shown include unvalidated device data.    Electronically Signed By: Antonio Luciano MD  November 28, 2022  9:43 AM

## 2022-11-28 NOTE — ANESTHESIA PROCEDURE NOTES
Adductor canal (Femoral via Adductor Canal) Procedure Note    Pre-Procedure   Staff -        Anesthesiologist:  Antonio Luciano MD       Performed By: Anesthesiologist       Location: pre-op       Procedure Start/Stop Times: 11/28/2022 7:21 AM and 11/28/2022 7:25 AM       Pre-Anesthestic Checklist: patient identified, IV checked, site marked, risks and benefits discussed, informed consent, monitors and equipment checked, pre-op evaluation, at physician/surgeon's request and post-op pain management  Timeout:       Correct Patient: Yes        Correct Procedure: Yes        Correct Site: Yes        Correct Position: Yes        Correct Laterality: Yes        Site Marked: Yes  Procedure Documentation  Procedure: Adductor canal (Femoral via Adductor Canal)       Laterality: right       Patient Position: supine       Patient Prep/Sterile Barriers: sterile gloves, mask       Skin prep: Chloraprep       Needle Type: insulated and short bevel       Needle Gauge: 21.        Needle Length (Inches): 4        Ultrasound guided       1. Ultrasound was used to identify targeted nerve, plexus, vascular marker, or fascial plane and place a needle adjacent to it in real-time.       2. Ultrasound was used to visualize the spread of anesthetic in close proximity to the above referenced structure.       3. A permanent image is entered into the patient's record.       4. The visualized anatomic structures appeared normal.       5. There were no apparent abnormal pathologic findings.    Assessment/Narrative         The placement was negative for: blood aspirated, painful injection and site bleeding       Paresthesias: No.       Test dose of mL at.         Test dose negative, 3 minutes after injection, for signs of intravascular, subdural, or intrathecal injection.       Bolus given via needle..        Secured via.        Insertion/Infusion Method: Single Shot       Complications: none       Injection made incrementally with aspirations  "every 3 mL.    Medication(s) Administered   Ropivacaine 0.5% PF (Infiltration) - Infiltration   10 mL - 11/28/2022 7:25:00 AM  Medication Administration Time: 11/28/2022 7:21 AM     Comments:  Pt mildly sedated, but communicative throughout.   Pt tolerated well.    No complications.      FOR [facility]) ONLY:   Pain Team Contact information: please page the Pain Team Via [employer]. Search \"Pain\". During daytime hours, please page the attending first. At night please page the resident first.    "

## 2022-11-28 NOTE — ANESTHESIA PROCEDURE NOTES
Popliteal (Sciatic via Popliteal approach) Procedure Note    Pre-Procedure   Staff -        Anesthesiologist:  Antonio Luciano MD       Performed By: anesthesiologist       Location: pre-op       Procedure Start/Stop Times: 11/28/2022 7:16 AM and 11/28/2022 7:20 AM       Pre-Anesthestic Checklist: patient identified, IV checked, site marked, risks and benefits discussed, informed consent, monitors and equipment checked, pre-op evaluation, at physician/surgeon's request and post-op pain management  Timeout:       Correct Patient: Yes        Correct Procedure: Yes        Correct Site: Yes        Correct Position: Yes        Correct Laterality: Yes        Site Marked: Yes  Procedure Documentation  Procedure: Popliteal (Sciatic via Popliteal approach)       Laterality: right       Patient Position: supine (using limb elevator)       Skin prep: Chloraprep       Needle Type: insulated and short bevel       Needle Gauge: 21.        Needle Length (Inches): 4        Ultrasound guided       1. Ultrasound was used to identify targeted nerve, plexus, vascular marker, or fascial plane and place a needle adjacent to it in real-time.       2. Ultrasound was used to visualize the spread of anesthetic in close proximity to the above referenced structure.       3. A permanent image is entered into the patient's record.       4. The visualized anatomic structures appeared normal.       5. There were no apparent abnormal pathologic findings.    Assessment/Narrative         The placement was negative for: blood aspirated, painful injection and site bleeding       Paresthesias: No.       Bolus given via needle..        Secured via.        Insertion/Infusion Method: Single Shot       Complications: none       Injection made incrementally with aspirations every 3 mL.    Medication(s) Administered   Ropivacaine 0.5% PF (Infiltration) - Infiltration   20 mL - 11/28/2022 7:20:00 AM  Medication Administration Time: 11/28/2022 7:16 AM    "  Comments:  Pt tolerated well.       FOR [facility] ([facility]) ONLY:   Pain Team Contact information: please page the Pain Team Via [employer]. Search \"Pain\". During daytime hours, please page the attending first. At night please page the resident first.    "

## 2022-11-28 NOTE — PROGRESS NOTES
Patient brought a picture of home covid antigen test on phone as directed.  I personally saw this result and it was time stamped 11/26/2022 @ 1400.  Result was NEGATIVE.

## 2022-11-28 NOTE — DISCHARGE INSTRUCTIONS
Same Day Surgery Discharge Instructions for  Sedation and General Anesthesia     It's not unusual to feel dizzy, light-headed or faint for up to 24 hours after surgery or while taking pain medication.  If you have these symptoms: sit for a few minutes before standing and have someone assist you when you get up to walk or use the bathroom.    You should rest and relax for the next 24 hours. We recommend you make arrangements to have an adult stay with you for at least 24 hours after your discharge.  Avoid hazardous and strenuous activity.    DO NOT DRIVE any vehicle or operate mechanical equipment for 24 hours following the end of your surgery.  Even though you may feel normal, your reactions may be affected by the medication you have received.    Do not drink alcoholic beverages for 24 hours following surgery.     Slowly progress to your regular diet as you feel able. It's not unusual to feel nauseated and/or vomit after receiving anesthesia.  If you develop these symptoms, drink clear liquids (apple juice, ginger ale, broth, 7-up, etc. ) until you feel better.  If your nausea and vomiting persists for 24 hours, please notify your surgeon.      All narcotic pain medications, along with inactivity and anesthesia, can cause constipation. Drinking plenty of liquids and increasing fiber intake will help.    For any questions of a medical nature, call your surgeon.    Do not make important decisions for 24 hours.    If you had general anesthesia, you may have a sore throat for a couple of days related to the breathing tube used during surgery.  You may use Cepacol lozenges to help with this discomfort.  If it worsens or if you develop a fever, contact your surgeon.     If you feel your pain is not well managed with the pain medications prescribed by your surgeon, please contact your surgeon's office to let them know so they can address your concerns.           Reasons to contact your surgeon:    Signs of possible  "infection: Check your incision daily for redness, swelling, warmth, red streaks or foul drainage.   Elevated temperature.  Pain not controlled with pain medication and/or rest.   Uncontrolled nausea or vomiting.  Any questions or concerns.          **If you have questions or concerns about your procedure,  call Dr. Louise at [phone]**           [facility]      DISCHARGE INSTRUCTIONS FOLLOWING   REGIONAL BLOCK ANESTHESIA    Numbness or lack of feeling in the arm/leg that was operated may last up to 24 hours.  The average time is usually 10-15 hours.  You may not be able to lift or move the arm or leg where the operation was by itself during that time.  Long-acting local anesthetic medicines were used to give you long-lasting pain relief.  Wear a sling until your arm is completely \"awake\"  Avoid bumping your arm, leg or foot while it is numb  Avoid extremes of hot or cold while it is numb  Remain quiet and restful the day of surgery.  Resume normal activities gradually over the next day or so as advise by your surgeon.  Do not drive or operate  Any machinery until your extremity is full  \"awake\"        You will have a tingling and prickly sensation in your arm/leg as the feeling begins to return; you can also expect some discomfort. The amount of discomfort is unpredictable, but if you have more pain that can be controlled with the pain medication you received, you should contact your surgeon.  Start to take your pain pills as soon as you start to feel any discomfort or pain.                   "

## 2022-12-21 ENCOUNTER — DOCUMENTATION ONLY (OUTPATIENT)
Dept: SURGERY | Facility: CLINIC | Age: 67
End: 2022-12-21

## 2022-12-21 DIAGNOSIS — R26.89 IMPAIRED GAIT AND MOBILITY: Primary | ICD-10-CM

## 2022-12-21 DIAGNOSIS — Z98.890 POSTOPERATIVE STATE: ICD-10-CM

## 2024-02-12 ENCOUNTER — TELEPHONE (OUTPATIENT)
Dept: PSYCHIATRY | Facility: CLINIC | Age: 69
End: 2024-02-12

## 2024-03-20 ENCOUNTER — VIRTUAL VISIT (OUTPATIENT)
Dept: PSYCHIATRY | Facility: CLINIC | Age: 69
End: 2024-03-20
Payer: COMMERCIAL

## 2024-03-20 VITALS — WEIGHT: 175 LBS | BODY MASS INDEX: 23.19 KG/M2 | HEIGHT: 73 IN

## 2024-03-20 DIAGNOSIS — F33.9 EPISODE OF RECURRENT MAJOR DEPRESSIVE DISORDER, UNSPECIFIED DEPRESSION EPISODE SEVERITY (H): Primary | ICD-10-CM

## 2024-03-20 ASSESSMENT — PAIN SCALES - GENERAL: PAINLEVEL: MODERATE PAIN (4)

## 2024-03-20 ASSESSMENT — PATIENT HEALTH QUESTIONNAIRE - PHQ9: SUM OF ALL RESPONSES TO PHQ QUESTIONS 1-9: 3

## 2024-03-20 NOTE — NURSING NOTE
Is the patient currently in the state of [place]? YES    Visit mode:VIDEO    If the visit is dropped, the patient can be reconnected by: VIDEO VISIT: Send to e-mail at: [email or web address]    Will anyone else be joining the visit? NO  (If patient encounters technical issues they should call [phone]    How would you like to obtain your AVS? MyChart    Are changes needed to the allergy or medication list? N/A    Reason for visit: Consult    Chandra HURTADO

## 2024-03-20 NOTE — PROGRESS NOTES
[facility] Treatment Resistant Depression Program  Diagnostic Assessment  A part of the [facility] Psychiatry Mood Disorders Program    Alton Bronson MRN# [id number]   Age: 68 year old YOB: 1955     Date of Evaluation: 3/20/24  Start Time: 10:00; End Time: 11:10    Mode of communication: [employer] (HIPAA compliant, secure platform). Patient consented verbally to this mode of therapy today.  Reason for telehealth: COVID-19. This patient visit was converted to a telehealth visit to minimize exposure to COVID-19.    Originating Location (patient location): home, located in [place]  Distant Location (provider location): Home office, located in [place], [place], using appropriate privacy considerations and procedures         Care Team     PCP- Alhaji López  Specialty Providers- none  Therapist- SHAKEEL Champion [facility], in private practice  Psychiatric Med Management Provider- Luis Thomas [facility] [place] [facility] [place]  Other Mental Health Providers- none    Referred by: psychiatrist  Referred for evaluation of:  depression         Contributors to the Assessment     Chart Reviewed.   Interview completed with Alton Bronson.  Releases of information signed?  no  Collateral information obtained? no           Chief Complaint     Long standing depression that has worsened in the context of medical conditions        Psychiatric History and Present Illness      Alton Bronson is a 68 year old male who goes by Yannick and uses he, him, his pronouns.    Alton reports on, on-going lifetime episode(s) of depression and has a history of no psychiatric hospitalization(s).     Yannick's first episode of depression started at in his 20s. Yannick reports that since then there has not been a period of at least two months with full resolution of symptoms.    Current psychosocial stressors include medical issues     Alton is interested in ketamine, or other treatments.      Past diagnoses: MDD, anxiety, alcohol abuse    Past medication  "trials: multiple trials    Hospitalizations: none    Commitment: No, Current [facility] order: No    ECT trials: No    TMS trials:  Yes - at [facility] - very little, of any, improvement in symptoms       Ketamine:  Yes - started 2/2024, \"It has taken me out of the severe depression range.\" Reports decreased SI, improved mood    Suicide attempts: No    Self-injurious behavior: No    Aggressive or violent behavior: No    Past Outpatient Programs & Services  Medication management, Outpatient individual psychotherapy, and Substance use treatment    Psych critical item history suicidal ideation, substance use: alcohol, mutiple psychotropic trials , TMS, ketamine, and major medical problems    Other mental health concerns discussed: anxiety, trauma, substance use    Sleep study: just completed - meeting later this week to get results, recommendations    ADHD testing: no    Current Outpatient Programs & Services  Medication management and Outpatient individual psychotherapy         Psychiatric Review of Systems (Completed M.I.N.I. Version 7.0.2)     A. DEPRESSION  Past 2 Weeks:  low mood nearly every day, anhedonia most of the time, and low energy    Past Episode:  low mood nearly every day, anhedonia most of the time, difficulties with sleep, low energy, worthlessness and/or guilt, and difficulty concentrating, thinking or making decisions, SI    B. SUICIDALITY:  Risk: Low  Current suicidal ideation: Yes, denies a plan, and denies intent.     -reports 0% in response to \"How likely are to you to try to kill yourself within the next 3 months on a scale from 0-100%?\"  -denies current SIB/Self Injurious Behavior and denies past SIB    -reports no lifetime suicide attempts.  He has notable risk factors for self-harm including feels trapped, new/ worsening medical issue, and male.  However, risk is mitigated by no plan or intent, no h/o risky impulsive behavior, h/o seeking help when needed, future oriented, none to minimal " alcohol use , commitment to family, and stable housing.      C. ИВАН/HYPOMANIA  Current Episode:  none    Past Episode:  none    D. PANIC:  none    E. AGORAPHOBIA:  none    F. SOCIAL ANXIETY:  none    G. OBSESSIVE-COMPULSIVE:  none    H. TRAUMA:  none    I. ALCOHOL & J. NON-ALCOHOL:  See below    K. PSYCHOSIS:   none    L-M. EATING DISORDER: none    N. GENERALIZED ANXIETY:  none    O. RULE OUT MEDICAL, ORGANIC OR DRUG CAUSES FOR ALL DISORDERS  During any current disorder or past mood episode, patient reports:  A. Substance use or withdrawal: Yes  B. Medical illness: Yes    P. ANTISOCIAL PERSONALITY:  none     Other Cluster B Traits Identified (not formally assessed):  none discussed    PHQ-9 Developed by Eyal De La Vega,Lexie Patricio,Brent Florez and Colleagues,with an Educational Kenn From [employer]  1.  Little interest or pleasure in doing things: Several days  2.  Feeling down, depressed, or hopeless: Several days  3.  Trouble falling or staying asleep, or sleeping too much: Not at all  4.  Feeling tired or having little energy: Several days  5.  Poor appetite or overeating: Not at all  6.  Feeling bad about yourself - or that you are a failure or have let yourself or your family down: Not at all  7.  Trouble concentrating on things, such as reading the newspaper or watching television: Not at all  8.  Moving or speaking so slowly that other people could have noticed. Or the opposite - being so fidgety or restless that you have been moving around a lot more than usual: Not at all  9.  Thoughts that you would be better off dead, or of hurting yourself in some way: Not at all  PHQ-9 Total Score: 3  If you checked off any problems, how difficult have these problems made it for you to do your work, take care of things at home, or get along with other people?: Somewhat difficult     SUBSTANCE USE HISTORY                                                                 CAGE-AID    Have you felt you  "ought to cut down on your drinking or drug use? yes  Have people annoyed you by criticizing your drinking or drug use? no  Have you felt bad or guilty about your drinking or drug use? yes  Have you ever had a drink or used drugs first thing in the morning to steady your nerves or to get rid of a hangover? no    CAGE-AID SCORE = 2      RECENT SUBSTANCE USE:     TOBACCO- none  CAFFEINE-  1-2 coffees/day and \"couple diet sodas\"  ALCOHOL- none - sober     CANNABIS- CBD edibles and topicals for pain            OTHER ILLICIT DRUGS- none    Past Use-   TOBACCO- quit 35+ years ago  CAFFEINE-  see above  ALCOHOL- quit 10 years ago  CANNABIS- quit 10 years ago            OTHER ILLICIT DRUGS- none    CD Treatment history: Yes - 12-step program  Medical consequences due to use (eg HIV/Hepatitis)- No  Legal consequences due to use- Yes - DUI    SOCIAL and FAMILY HISTORY                                                             Alton Bronson is a 68 year old   male with a psychiatric history of depression, anxiety, and substance use who presents for a [facility] Treatment Resistant Depression program evaluation. Alton was referred by his psychiatrist.     Living situation: Alton lives with his wife in a Private Residence.   Kids? Yes - two adult sons, [place], 30, in [place] and [place], 29, in [place]  Pets? Yes - two dogs, a cat, and a bird  Guns, weapons, or other means to harm oneself in the home? No     Education: Alton s highest level of education is college graduate    Occupation: Alton is currently retired    Finances: Alton is financial supported by  [facility]    Relationships (kid/grandkids, spouse/partner, friends, support people): Specific Relationships & Quality of Relationship: Yannick has been  to his wife, Lisa, since 1990 and has two adult sons. He has friends he can talk to about mental health and illness, and he is actively involved in the sobriety community.     Spiritual considerations: " "No    Legal History: Yes - DUI    Trauma/Abuse History: No     History: No    Family Mental Health History: Brother - completed suicide at age 18; mother - anxiety \"pretty sure\"; grandmother - depression, had ECT    Strengths & Coping Strategies:  Yannick is pleasant and easy to engage. He has close, supportive relationships and is focused on maintaining his sobriety. He is actively involved in medical care for some significant diagnoses/symptoms, and is seeking additional care.     PAST PSYCH MED TRIALS      Will be reviewed during MTM.    MEDICAL / SURGICAL HISTORY                                   There is no problem list on file for this patient.      Past Surgical History:   Procedure Laterality Date    ARTHRODESIS TOE(S) Right 11/28/2022    Procedure: RIGHT FIRST METATARSOPHALANGEAL JOINT ARTHRODESIS,;  Surgeon: Alhaji Louise MD;  Location: [facility] OR    BUNIONECTOMY Right 11/28/2022    Procedure: RIGHT BUNIONETTE REPAIR;  Surgeon: Alhaji Louise MD;  Location: [facility] OR    GASTRIC FUNDOPLICATION      HERNIA REPAIR      IR THORACENTESIS  10/10/2023    IR THORACENTESIS  9/26/2023    IR THORACENTESIS  9/18/2023    IR THORACENTESIS  9/6/2023    IR THORACENTESIS  8/29/2023    IR THORACENTESIS  8/18/2023    IR THORACENTESIS  8/7/2023    IR THORACENTESIS  7/27/2023    IR THORACENTESIS  7/17/2023    IR THORACENTESIS  1/12/2023    IR THORACENTESIS  12/29/2022    IR THORACENTESIS  12/23/2022    IR THORACENTESIS  12/16/2022    IR THORACENTESIS  10/18/2022    IR THORACENTESIS  10/10/2022    IR THORACENTESIS  10/3/2022    IR THORACENTESIS  9/27/2022    IR THORACENTESIS  9/14/2022    IR THORACENTESIS  9/1/2022    IR THORACENTESIS  8/29/2022    SEPTOPLASTY      TONSILLECTOMY          History of seizures: no   History of head trauma/loss of consciousness: no     ALLERGY                                Lisinopril and Simvastatin    MEDICATIONS                               Current Outpatient Medications   Medication Sig " Dispense Refill    albuterol (PROAIR HFA/PROVENTIL HFA/VENTOLIN HFA) 108 (90 Base) MCG/ACT inhaler Inhale 2 puffs into the lungs      alum hydroxide-mag carbonate (GENACTON)  MG/15ML SUSP suspension Take 15 mLs by mouth      Ascorbic Acid 500 MG CHEW Currently taking 100mg Daily.      buPROPion (WELLBUTRIN XL) 150 MG 24 hr tablet TAKE 1 TABLET BY MOUTH EVERY DAY IN THE MORNING      busPIRone (BUSPAR) 10 MG tablet Take 10 mg by mouth      diltiazem ER (DILT-XR) 120 MG 24 hr capsule TAKE 1 CAPSULE BY MOUTH EVERY DAY      famotidine (PEPCID) 20 MG tablet Take 20 mg by mouth 2 times daily      fish oil-omega-3 fatty acids 1000 MG capsule Take 2 g by mouth      furosemide (LASIX) 20 MG tablet Take 20 mg by mouth daily      gabapentin (NEURONTIN) 300 MG capsule Take 1,500 mg by mouth      hydrochlorothiazide (HYDRODIURIL) 25 MG tablet Take 0.5 tablets by mouth daily      HYDROcodone-acetaminophen (NORCO) 5-325 MG tablet Take 1-2 tablets by mouth every 4 hours as needed for moderate to severe pain 30 tablet 0    hydrOXYzine (ATARAX) 25 MG tablet Take 1 tablet (25 mg) by mouth 3 times daily as needed for itching 20 tablet 0    ibuprofen (ADVIL/MOTRIN) 800 MG tablet Take 1 tablet (800 mg) by mouth every 8 hours as needed for moderate pain (4-6) 50 tablet 0    Lactobacillus (PROBIOTIC ACIDOPHILUS PO)       lamoTRIgine (LAMICTAL) 100 MG tablet TAKE 2 TABLETS BY MOUTH IN THE MORNING AND TAKE 1 TABLET BY MOUTH AT BEDTIME      levothyroxine (SYNTHROID/LEVOTHROID) 137 MCG tablet Take 1 tablet by mouth daily      losartan (COZAAR) 25 MG tablet Take 1 tablet by mouth daily      melatonin 5 MG tablet Take 5 mg by mouth      Multiple Vitamin (MULTIVITAMIN ADULT PO)       omeprazole (PRILOSEC) 20 MG DR capsule Take 20 mg by mouth      Probiotic Product (ALIGN) CAPS Take 1 tablet by mouth daily      Prucalopride Succinate (MOTEGRITY) 2 MG TABS Take 1 tablet by mouth daily      rosuvastatin (CRESTOR) 5 MG tablet Take 1 tablet by  "mouth daily      senna (SENOKOT) 8.6 MG tablet Take 1 tablet by mouth daily      sildenafil (REVATIO) 20 MG tablet Take 1-5 tablets 1 hour before SA, take on an empty stomach.      tadalafil (CIALIS) 5 MG tablet Take 5 mg by mouth every 24 hours      terbutaline (BRETHINE) 5 MG tablet At 3 hrs of erection , take 1 tab. At 3 1/2 hrs, take an additional tab. At 4 hrs go to ER.      thiamine (B-1) 100 MG tablet Take 100 mg by mouth      vilazodone (VIIBRYD) 40 MG TABS tablet Take 40 mg by mouth      vitamin D3 (CHOLECALCIFEROL) 125 MCG (5000 UT) tablet          VITALS                                                                                                                            3, 3   Ht 1.854 m (6' 1\")   Wt 79.4 kg (175 lb)   BMI 23.09 kg/m       MENTAL STATUS EXAM                                                                                    9, 14 cog gs     Alertness: alert  and oriented  Appearance: well groomed  Behavior/Demeanor: cooperative, pleasant, and calm, with good  eye contact   Speech: normal  Language: intact  Psychomotor: normal  Mood: depressed  Affect: full range; was congruent to mood; was congruent to content  Thought Process/Associations: unremarkable  Thought Content:  Reports suicidal ideation without plan; without intent [details in Interim History];  Denies violent ideation  Perception:  Reports none;  Denies auditory hallucinations and visual hallucinations  Insight: good  Judgment: good and adequate for safety  Cognition: does appear grossly intact; formal cognitive testing was not done    PSYCHIATRIC DIAGNOSES                                                                                               Major depressive disorder    Alcohol use disorder, by history      ASSESSMENT                                                                                                          m2, h3     Please note, writer did not receive all pertinent medical records as of the time " of this assessment. Alton did not sign RUSSEL's for additional records.     Today, Alton presents as a Good historian with Good insight. Alton s past and present depressive symptoms seem consistent with a diagnosis of major depressive disorder. Depressive symptoms seem to contribute to impaired functioning in the areas of physical health and emotional wellbeing . Precipitating factors may include family history. Perpetuating factors may consist of multiple medication and interventional trials.     The M.I.N.I. scores positively for a diagnosis/diagnoses of none.     Substance use does not seem to be a current problem.     Further diagnostic information is not needed to clarify or rule out diagnosis(es).     Yannick reports that he first experienced anxiety and panic in his 20s, and it affected his work and confidence. He reports that he was a heavy drinker on and off starting in his early teens until 10 years ago when he made the decision to get sober. He describes periods of time that he drank daily and used cannabis interrupted - often when his wife would express the need for him to change - with periods of not drinking/using at all. He continues to focus on staying sober and notes that he is not bothered by other people drinking or being around alcohol at this point, but is very clear that he is not able to drink moderately.     Yannick [facility]. He reports improved mood and a significant decrease in SI since starting.     Basic needs (food, housing, transportation, safety, income stability): all met    Today, based on all available evidence, he does not appear to be at imminent risk for self-harm therefore does not meet criteria for a 72-hr hold/  involuntary hospitalization.     Additional steps to minimize risk discussed, include: people to reach out to, providers to reach out to, crisis numbers and texts, and EmPATH.  24/7 crisis resources were provided verbally.     PLAN                                                                                                                         m2, h3   Patient will meet with [facility] PharmD and [facility] Psychiatrist to complete comprehensive multi-disciplinary assessment. Informed Peter that if appropriate for Interventional Psychiatry treatments, care will be provided with goal of stabilization with subsequent transfer back to the community (i.e. PCP or previous psychiatrist).    Medications: to be addressed during an MTM visit and new patient medication evaluation with a Psychiatrist    Therapy:  Yes - continue with current therapist and attending meetings.    Crisis Resources provided:  24/7 crisis resources including but not limited to the following:   - National Suicide Prevention Hotline: 1-520-441-TALK (1462)   - Crisis Text Line: Text START to 679-915   - Mental Health Emergency Number: 988   - EmPATH at [facility]    Other Referrals:  No    RTC: For MTM visit.    [facility]         Virtual Visit Details    Type of service:  Video Visit   Video Start Time:  10:01  Video End Time: 11:04    Originating Location (pt. Location): Home    Distant Location (provider location):  Off-site  Platform used for Video Visit: [employer]ian

## 2024-04-03 ENCOUNTER — VIRTUAL VISIT (OUTPATIENT)
Dept: PSYCHIATRY | Facility: CLINIC | Age: 69
End: 2024-04-03
Payer: COMMERCIAL

## 2024-04-03 VITALS — BODY MASS INDEX: 24.12 KG/M2 | WEIGHT: 182 LBS | HEIGHT: 73 IN

## 2024-04-03 DIAGNOSIS — F33.9 EPISODE OF RECURRENT MAJOR DEPRESSIVE DISORDER, UNSPECIFIED DEPRESSION EPISODE SEVERITY (H): Primary | ICD-10-CM

## 2024-04-03 ASSESSMENT — PAIN SCALES - GENERAL: PAINLEVEL: MILD PAIN (3)

## 2024-04-03 ASSESSMENT — PATIENT HEALTH QUESTIONNAIRE - PHQ9: SUM OF ALL RESPONSES TO PHQ QUESTIONS 1-9: 12

## 2024-04-03 NOTE — PROGRESS NOTES
Patient:  Alton Bronson  :  1955   Age:  68 year old   Today's Video Visit:  4/3/2024    [facility]                                                              [address], [address]  [place], [place]  [address]       [facility] ([facility]D) [facility]   Medication Therapy Management   Video Visit Note  This video visit is from my home to the patient's home via [employer] to reduce exposure to COVID. We used [employer] in case we get disconnected, we will use [employer] [phone].        Identifying Information and Introduction:                               This is an adult patient, Alton is a 68 year old retired white male, who prefers the name Yannick and uses pronouns he, him. Patient is seen in video visit today for a [facility] TRD MTM Consultation.       Patient lives in [place], [place]                                                                                                       This patient is a new adult to the [facility] TRD MTM program.       Psychiatrist is: Dr. Fleming will see the patient on 2024 which was communicated to the patient                                                                                                                                        Chief Complaint                                   Depression, anxiety [history of PA, ADD, AUD, Xanax abuse, clean since 2014]     Reason for Consultation                                Rating medication trials for antidepressant failure and assessment of antidepressant drugs and their history.                                                  Informants include: Patient and review of past medical record. Please note that during the consultation I was not in the complete possession of all past medical records and psychiatrist medical records.     Pertinent Background  : [initial eval 24]     Yannick is retired, , college graduate and a  patient of Dr. Luis Thomas.  He has hypertension, hypothyroidism as a result of treatment with radioactive iodine in his teens, diaphragmatic paralysis, disorder of phrenic nerve, GERD, successful treatment at [place], and member of AA.     Psych pertinent item history includes suicidal ideation, substance use: alcohol, cannabis, and Some experimentation with cocaine in college, mutiple psychotropic trials , TMS, ketamine, major medical problems, and FHx psych- Younger brother committed suicide age 18, mental health issue on maternal and paternal side    Medication Information                                                    Current Outpatient Medications   Medication Sig Dispense Refill    albuterol (PROAIR HFA/PROVENTIL HFA/VENTOLIN HFA) 108 (90 Base) MCG/ACT inhaler Inhale 2 puffs into the lungs      alum hydroxide-mag carbonate (GENACTON)  MG/15ML SUSP suspension Take 15 mLs by mouth      Ascorbic Acid 500 MG CHEW Currently taking 100mg Daily.      buPROPion (WELLBUTRIN XL) 150 MG 24 hr tablet TAKE 1 TABLET BY MOUTH EVERY DAY IN THE MORNING      busPIRone (BUSPAR) 10 MG tablet Take 10 mg by mouth      diltiazem ER (DILT-XR) 120 MG 24 hr capsule TAKE 1 CAPSULE BY MOUTH EVERY DAY      famotidine (PEPCID) 20 MG tablet Take 20 mg by mouth 2 times daily      fish oil-omega-3 fatty acids 1000 MG capsule Take 2 g by mouth      furosemide (LASIX) 20 MG tablet Take 20 mg by mouth daily      gabapentin (NEURONTIN) 300 MG capsule Take 1,500 mg by mouth      hydrochlorothiazide (HYDRODIURIL) 25 MG tablet Take 0.5 tablets by mouth daily      HYDROcodone-acetaminophen (NORCO) 5-325 MG tablet Take 1-2 tablets by mouth every 4 hours as needed for moderate to severe pain 30 tablet 0    hydrOXYzine (ATARAX) 25 MG tablet Take 1 tablet (25 mg) by mouth 3 times daily as needed for itching 20 tablet 0    ibuprofen (ADVIL/MOTRIN) 800 MG tablet Take 1 tablet (800 mg) by mouth every 8 hours as needed for moderate pain (4-6) 50  tablet 0    Lactobacillus (PROBIOTIC ACIDOPHILUS PO)       lamoTRIgine (LAMICTAL) 100 MG tablet TAKE 2 TABLETS BY MOUTH IN THE MORNING AND TAKE 1 TABLET BY MOUTH AT BEDTIME      levothyroxine (SYNTHROID/LEVOTHROID) 137 MCG tablet Take 1 tablet by mouth daily      losartan (COZAAR) 25 MG tablet Take 1 tablet by mouth daily      melatonin 5 MG tablet Take 5 mg by mouth      Multiple Vitamin (MULTIVITAMIN ADULT PO)       omeprazole (PRILOSEC) 20 MG DR capsule Take 20 mg by mouth      Probiotic Product (ALIGN) CAPS Take 1 tablet by mouth daily      Prucalopride Succinate (MOTEGRITY) 2 MG TABS Take 1 tablet by mouth daily      rosuvastatin (CRESTOR) 5 MG tablet Take 1 tablet by mouth daily      senna (SENOKOT) 8.6 MG tablet Take 1 tablet by mouth daily      sildenafil (REVATIO) 20 MG tablet Take 1-5 tablets 1 hour before SA, take on an empty stomach.      tadalafil (CIALIS) 5 MG tablet Take 5 mg by mouth every 24 hours      terbutaline (BRETHINE) 5 MG tablet At 3 hrs of erection , take 1 tab. At 3 1/2 hrs, take an additional tab. At 4 hrs go to ER.      thiamine (B-1) 100 MG tablet Take 100 mg by mouth      vilazodone (VIIBRYD) 40 MG TABS tablet Take 40 mg by mouth      vitamin D3 (CHOLECALCIFEROL) 125 MCG (5000 UT) tablet        According to patient these are the medications he is taking:    Losartan 100 mg every morning  Diltiazem  mg every morning  Hydrochlorothiazide 25 mg every morning  Aspirin 85 mg at bedtime  Levothyroxine/Synthroid 175 microgram every morning  Omeprazole 20 mg twice daily  Famotidine 20 mg a.m. and 40 mg p.m. for a total of 60 mg/day for GERD  Rosuvastatin 5 mg nightly  Viagra / Sildenafil 60 mg as needed 1/d    Current Psychotropic Medications:    Viibryd/Vilazodon 40 mg every morning  Melatonin 5 mg nightly  Trazodone 50 mg nightly  Gabapentin 1500 mg nightly for sleep? and pain  Lamotrigine 100 mg nightly  Bupropion 75 mg every morning  BuSpar 15 mg twice daily for a total of 30  mg/day  Lithium 150 mg nightly was added as augmentation      Herbal Remedies:   Cannabis : CBD edibles 50 mg/d for pain and topical applications  Vitamin D3 1000 mg/day  Fish oil omega-3 1 g/day  Multivitamin/ PreserVision  Probiotics   Patient gets 2 L of oxygen every night                                                                                                         Drug to Drug Interaction      Patient has overall 14 interactions    Buspirone and lithium or Viibryd or trazodone together may increase the risk for serotonin syndrome  Bupropion and buspirone or lithium may result in increased risk of seizures  Lithium and diltiazem may result in neurotoxicity, psychosis  Buspirone and diltiazem may increase buspirone effect  Lithium and losartan or hydrochlorothiazide may result in lithium toxicity  Famotidine and bupropion may result in reduced renal clearance of famotidine  Buspirone and gabapentin resulted in increased respiratory depression    Current Reported Side Effects      Patient reports side effects to current psychiatric medications:  No   Gene testing:  Yes   Results: Will fax it     ALLERGIES/SENSITIVITY     Lisinopril and Simvastatin     MEDICAL HISTORY     There is no problem list on file for this patient.                              Psychiatric pertinent history                            Depression History  1. First time experienced:  At age Anxiety in his late 20s together with the depression, and they started first with therapy   2. First time Antidepressant Drug started:  At age 28. Drug: Prozac and alprazolam were started together and according to the patient it worked for approximately 20 years off and on   3. Last Episode started: Date: Continues and it would go in waves, patient was also abusing alcohol during some of that time     4. Hospitalization for severe (SI) suicidal ideation or (SA) suicide attempt   No Date:   Comments:    5. Suicidal ideation current: Patient's PHQ-9  was 12 today and question #9 was 1, patient stated he has sometimes passive thoughts but nothing really serious  6. Therapist: Has a new therapist  6. (CBT) Cognitive behavioral therapy  . Effective:     7. (DBT) Dialectical behavior therapy    . Effective:     8. Emergency Plan: Patient will go to bed and sleep, will talk to his wife, call his new therapist , will join his AA group meeting, playing with his 2 dogs: Got all the emergency numbers from our  [facility].           Social and Environmental Aspects                          A. Living situation is: lives with their spouse  B. Does patient have a pet  Yes. Pet dog, cat, and bird  C. Marital Status:                                 Pharmacotherapy Indicators: Pharmaceutical Aspects:       1. Economic Assessment: Patient has Medicare [facility] with prescription coverage  Prescription drug copayment is $ Manageable                                      The cost of obtaining prescribed medications:  Does not interfere with compliance.   Comment:  2. Patient's Pharmacy: [facility]                                                            3. Compliance assessment shows that the patient is Independent in medication administration  4. Historian: the patient is a fair historian  5. Pill box:  Is filled for 2 weeks  c. The type of pillbox used is: Weekly x 2  d. Misses Medications: adherent -Misses rarely                              Pharmacokinetic                  Habits and Chemical Use  A. Alcohol: Patient had successful alcohol treatment at [place], and follows  and is alcohol free since January 01/01/ 2014 > than 10 years.  B. Tobacco: Patient smoked from age 13 until his late 20s  C. Caffeine: 1 cups/day of coffee  D. Recreational Drugs: Patient smoked marijuana and quit 10 years ago and tried cocaine in college  - currently he uses CBD for pain edibles and topical for pain 50 mg/day which goes through the pain clinic  E. Exercise:  Patient does some stretches currently,  F: Hobbies: Working around the house, doing projects, volunteering in a senior plays with his dog    Rating of Antidepressant Drugs:                  Selective serotonin reuptake inhibitor:   Fluoxetine/ Prozac was tried for 20 years- max dose 20 or 40 mg/day-until it stopped working, it was used together with Xanax    Serotonin - Noradrenaline  reuptake inhibitor:   Duloxetine/Cymbalta was tried and it was very difficult to get off it  Desvenlafaxine/Pristiq was tried was ineffective    Serotonin Modulator and Stimulator:   Vilazodone / Viibryd was tried from December 2022 to present max dose 40 mg/day, it appears it is not working currently    Noradrenaline and Dopamine reuptake inhibitor:   Bupropion/Wellbutrin was tried -max dose 150 mg/day and patient got agitated on the higher dose    Tricyclic Antidepressants (TCA):   Negative    Tetracyclic Antidepressants:   Mirtazapine/Remeron was tried  Trazodone 50 mg for sleep, on the higher dose of 100 mg patient got really groggy the next morning    Monoamine Oxidase Inhibitor (MAOI):   Negative    Augmentation/Bipolar Therapy:       Lithium since  2023- till present max dose 150 mg/day the dose was decreased to 75 mg/day. I assume patient might have had side effects after diltiazem and losartan may result in a neurotoxicity together with lithium?  Lamotrigine, max dose 300 mg/day as a mood stabilizer  Levothyroxine/Synthroid - max dose of 175 mcg  Pregabalin/Lyrica was tried with no change         Miscellaneous Augmentation Therapy:      Antipsychotics:   Aripiprazole/Abilify per chart was prescribed 2 mg in 2023 according to the patient ineffective        Stimulants:   Atomoxetine/Strattera was prescribed and was ineffective according to the patient?  One of the past diagnosis in the chart was ADD and a Adderall start 1/23/2015?       Benzodiazepines:   Alprazolam/Xanax was prescribed but according to the patient 30+ years,  and max dose I saw as needed 5 mg/day  Clonazepam/Klonopin was tried     Miscellaneous:   Buspirone/BuSpar was tried up to 30 mg/day:Do not forget the drug interactions- see above - what can happen e.g. with buspirone and diltiazem that can increase the buspirone effect  Gabapentin max dose 1500 mg/day  Omega-3 triglyceride fish oil max dose 2000 mg/day  Propranolol was tried for anxiety as needed  Pramipexole/Mirapex 0.25 mg in June 2023 was discontinued because it was ineffective     Miscellaneous Sleep Aides:   Diphenhydramine 50 mg IV was given for itch  Melatonin was tried  Clonazepam was tried  Gabapentin was tried  Trazodone was tried  Mirtazapine was tried     Ketamine Treatment:   K[facility]amine IV was [facility]- started on February 13, 2024-2 times per week for 3 weeks and March 8, 2020 24-one time per week: Overall it improved his mood and he felt better for 6 weeks       Other Reported Treatment for Depression and Related Mood Disorder History:   TMS was done at [facility] patient things 36 applications and he had very little improvement  Patient has a light box but it is not used  CPAP he will have a sleep study to evaluate it, but patient gets oxygen at night each night 2 L     Comments:   Patient is interested in ketamine or other treatments we could offer     Patient will follow with MTM as needed. All MTM findings will be shared with clinic psychiatrist. Patient was instructed to return for upcoming appointment with Dr. Fleming for recommendations and future treatment options.       GHADA GUTIERREZ, PHARMD    Pharmaceutical Care Coordinator   Time spent was 120 minutes without the patient and 79 minutes with the patient.        Virtual Visit Details    Type of service:  Video Visit   Video Start Time:  8:54 am  Video End Time: 10:13 am    Originating Location (pt. Location): Home    Distant Location (provider location):  Off-site  Platform used for Video Visit: Huayi

## 2024-04-03 NOTE — NURSING NOTE
Patient scored 12 on PHQ-9, #9 1=Several days      Depression Response    Patient completed the PHQ-9 assessment for depression and scored >9? Yes  Question 9 on the PHQ-9 was positive for suicidality? Yes  Does patient have current mental health provider? Yes    Is this a virtual visit? Yes   Does patient have suicidal ideation (positive question 9)? Yes    I personally notified the following: visit provider Put PHQ-9 score in MSG Column for providor awareness.            Is the patient currently in the state of [place]? YES    Visit mode:VIDEO    If the visit is dropped, the patient can be reconnected by: VIDEO VISIT: Send to e-mail at: [email or web address]    Will anyone else be joining the visit? NO  (If patient encounters technical issues they should call [phone]    How would you like to obtain your AVS? MyChart    Are changes needed to the allergy or medication list? N/A    Are refills needed on medications prescribed by this physician?     Reason for visit: Consult (As of Feb 13th, 2024 Currently trying Ketamine infusions that have not been successful. )    Buffy HURTADO

## 2024-04-12 ENCOUNTER — TRANSFERRED RECORDS (OUTPATIENT)
Dept: PSYCHIATRY | Facility: CLINIC | Age: 69
End: 2024-04-12

## 2024-04-19 ASSESSMENT — ANXIETY QUESTIONNAIRES
6. BECOMING EASILY ANNOYED OR IRRITABLE: NOT AT ALL
1. FEELING NERVOUS, ANXIOUS, OR ON EDGE: NOT AT ALL
5. BEING SO RESTLESS THAT IT IS HARD TO SIT STILL: NOT AT ALL
GAD7 TOTAL SCORE: 4
4. TROUBLE RELAXING: NOT AT ALL
7. FEELING AFRAID AS IF SOMETHING AWFUL MIGHT HAPPEN: NOT AT ALL
GAD7 TOTAL SCORE: 4
7. FEELING AFRAID AS IF SOMETHING AWFUL MIGHT HAPPEN: NOT AT ALL
3. WORRYING TOO MUCH ABOUT DIFFERENT THINGS: MORE THAN HALF THE DAYS
2. NOT BEING ABLE TO STOP OR CONTROL WORRYING: MORE THAN HALF THE DAYS
GAD7 TOTAL SCORE: 4

## 2024-04-22 ENCOUNTER — OFFICE VISIT (OUTPATIENT)
Dept: PSYCHIATRY | Facility: CLINIC | Age: 69
End: 2024-04-22
Payer: COMMERCIAL

## 2024-04-22 VITALS
OXYGEN SATURATION: 95 % | TEMPERATURE: 97.1 F | BODY MASS INDEX: 23.75 KG/M2 | WEIGHT: 180 LBS | SYSTOLIC BLOOD PRESSURE: 118 MMHG | HEART RATE: 68 BPM | DIASTOLIC BLOOD PRESSURE: 76 MMHG

## 2024-04-22 DIAGNOSIS — E03.9 HYPOTHYROIDISM, UNSPECIFIED TYPE: ICD-10-CM

## 2024-04-22 DIAGNOSIS — F33.9 EPISODE OF RECURRENT MAJOR DEPRESSIVE DISORDER, UNSPECIFIED DEPRESSION EPISODE SEVERITY (H): Primary | ICD-10-CM

## 2024-04-22 ASSESSMENT — PATIENT HEALTH QUESTIONNAIRE - PHQ9: SUM OF ALL RESPONSES TO PHQ QUESTIONS 1-9: 9

## 2024-04-22 NOTE — PROGRESS NOTES
" [address], [address]  [place] [address]  New Patient Evaluation       Alton Bronson is a 68 year old male who prefers the name \"Yannick\" and uses pronouns he, him.    The patient reports past psychiatric diagnoses of depression, anxiety, and alcohol use disorder, with relevant general medical diagnoses of phrenic nerve paralysis, hypothyroidism (on Synthroid), chronic pain (on opioids), and HTN.    The patient was referred by his psychiatrist for evaluation of depression.       History was provided by patient who was a good historian.    Additional information was drawn from chart review, including notes by the following providers corroborated by the patient today:  Medication Therapy Management (MTM) by Jessica Navarro RPh (4/3/24)  Diagnostic Assessment (DA) by ROEL Brown (3/20/24)       Care Team                                                                                               Therapist: SHAKEEL Champion, [facility] (private practice)  Psychiatrist: Luis Thomas ([facility])  PCP: Alhaji López        Chief Complaint                                                                                                        \"I sure want to get better\"       History of Present Illness                                                                                      Pertinent Background and Present Symptoms:  The patient describes onset of depression in late 20s.  Initially, it was also associated with anxiety and panic, in the setting of heavy alcohol use and cannabis use, which continued until his late 50s when he decided to get sober.      His depression was present but low-grade and manageable up until 18 months ago, becoming \"the worst it's been.\"  This began after phrenic nerve paralysis during a pleurodesis for his pleural effusions of unclear etiology.  He has since developed an effusion on the other side and continues to have to drain at home.  He has had other unusual " "medical complications including multiple Pseudomonal infections and low lymphocyte counts, although none of this has yet been linked biologically to his depressive symptoms.    For his depression, he began [facility] and has noticed \"mixed results.\"  6 sessions in, he had a significant benefit, but then a few weeks later he lost most of that benefit.  Tried returning to 2x/week but didn't adequately rescue and bring him back to the initial mood.  He presents today for a second opinion and new ideas for how to target the depression.      Today, he identifies most prominent symptoms of anhedonia, fatigue, and low mood, with additional associated feelings of guilt.  Although he denies any suicidal thoughts today, these have been present at times in the past.    In addition to the above depressive symptoms, he identifies the following concomitant symptoms on review of systems:    Anxiety:  CATHRYN: denies   Panic: denies panic attacks  Social phobia: denies   Agoraphobia: denies   Obsessions: denies  Compulsions: denies     Trauma-Related:  Trauma: denies     Mariella: denies     Psychosis: denies     Eating Disorder: denies    Homicidal Ideation: denies     Current Substance Use: denies    At this time, the patient is interested in any interventional options suggested.    Target Symptoms for Improvement:  Anhedonia  Fatigue  Low mood       Substance Use History     Alcohol: past (heavy use up until 10 years ago)  Cannabinoids: CURRENT (currently uses CBD edibles and topicals for pain; used cannabis until 10 years ago)  Other Illicit Drugs: never  Nicotine: past (quit 35+ years ago)  Caffeine: CURRENT (1-2 coffees/day and \"a couple of diet sodas\")    Substance Use Treatment:  12 Step program  Overuse/Withdrawal Symptoms: denies  Bloodborne Infections: denies  Legal Consequences:  DUI       Past Psychiatric History     Current Medications:  Viibryd, Wellbutrin SR, Lithium, Lamictal, BuSpar, Neurontin, " trazodone, melatonin  Current Therapy:  Sees Hung Baer, SHAKEEL, MELLYC    Reported Diagnoses:  MDD, anxiety, alcohol abuse  Hospitalizations: denies  PHP / IOPs: denies    Suicidality: denies  Non-Suicidal Self-Injury: denies  Violence/Aggression: denies    Neuromodulation Treatments:  ECT: denies  TMS:  [facility] treatments at [facility] - little benefit  Ketamine:  IV ketamine 110mg at [facility] - this helped initially, but lost benefit after first few sessions  VNS: denies  DBS: denies    Key Symptoms:  Mariella: denies  Psychosis: denies  Disordered Eating: denies  Trauma-Related: denies      Psychiatric Medication Trials     Complete list per 4/3/24 note from Dr. Navarro:    Selective serotonin reuptake inhibitor:   Fluoxetine/ Prozac was tried for 20 years- max dose 20 or 40 mg/day-until it stopped working, it was used together with Xanax     Serotonin - Noradrenaline  reuptake inhibitor:   Duloxetine/Cymbalta was tried and it was very difficult to get off it  Desvenlafaxine/Pristiq was tried was ineffective     Serotonin Modulator and Stimulator:   Vilazodone / Viibryd was tried from December 2022 to present max dose 40 mg/day, it appears it is not working currently     Noradrenaline and Dopamine reuptake inhibitor:   Bupropion/Wellbutrin was tried -max dose 150 mg/day and patient got agitated on the higher dose     Tricyclic Antidepressants (TCA):   Negative     Tetracyclic Antidepressants:   Mirtazapine/Remeron was tried  Trazodone 50 mg for sleep, on the higher dose of 100 mg patient got really groggy the next morning     Monoamine Oxidase Inhibitor (MAOI):   Negative     Augmentation:   Mood Stabilizers:   Lithium since  2023- till present max dose 150 mg/day the dose was decreased to 75 mg/day. I assume patient might have had side effects after diltiazem and losartan may result in a neurotoxicity together with lithium?  Lamotrigine, max dose 300 mg/day as a mood stabilizer  Levothyroxine/Synthroid - max dose of 175  "mcg  Pregabalin/Lyrica was tried with no change         Antipsychotics:  Aripiprazole/Abilify per chart was prescribed 2 mg in 2023 according to the patient ineffective         Stimulants:  Atomoxetine/Strattera was prescribed and was ineffective according to the patient?  One of the past diagnosis in the chart was ADD and a Adderall start 1/23/2015?        Benzodiazepines:  Alprazolam/Xanax was prescribed but according to the patient 30+ years, and max dose I saw as needed 5 mg/day  Clonazepam/Klonopin was tried      Miscellaneous:  Buspirone/BuSpar was tried up to 30 mg/day:Do not forget the drug interactions- see above - what can happen e.g. with buspirone and diltiazem that can increase the buspirone effect  Gabapentin max dose 1500 mg/day  Omega-3 triglyceride fish oil max dose 2000 mg/day  Propranolol was tried for anxiety as needed  Pramipexole/Mirapex 0.25 mg in June 2023 was discontinued because it was ineffective      Sleep:  Diphenhydramine 50 mg IV was given for itch  Melatonin was tried  Clonazepam was tried  Gabapentin was tried  Trazodone was tried  Mirtazapine was tried         Psychotherapy Trials     Currently working with a therapist       Family Psychiatric History     Brother - completed suicide at age 18  mother - anxiety \"pretty sure\"  grandmother - depression, had ECT        Social History     Financial:  Currently retired  Relationships:  to wife Lisa since 1990, 2 adult sons (don't live locally)  Residence:  With wife and pets (2 dogs, 1 cat, and 1 bird)  Legal:  Past DUI  Education:  College graduate  Supports:  Wife, friends, sober network  Cultural:  Denies spiritual  Firearms:  denies  Trauma:  denies       General Medical History     Problems:  There is no problem list on file for this patient.      Surgeries:  Past Surgical History:   Procedure Laterality Date    ARTHRODESIS TOE(S) Right 11/28/2022    Procedure: RIGHT FIRST METATARSOPHALANGEAL JOINT ARTHRODESIS,;  Surgeon: " Alhaji Louise MD;  Location: [facility] OR    BUNIONECTOMY Right 11/28/2022    Procedure: RIGHT BUNIONETTE REPAIR;  Surgeon: Alhaji Louise MD;  Location: [facility] OR    GASTRIC FUNDOPLICATION      HERNIA REPAIR      IR THORACENTESIS  10/10/2023    IR THORACENTESIS  9/26/2023    IR THORACENTESIS  9/18/2023    IR THORACENTESIS  9/6/2023    IR THORACENTESIS  8/29/2023    IR THORACENTESIS  8/18/2023    IR THORACENTESIS  8/7/2023    IR THORACENTESIS  7/27/2023    IR THORACENTESIS  7/17/2023    IR THORACENTESIS  1/12/2023    IR THORACENTESIS  12/29/2022    IR THORACENTESIS  12/23/2022    IR THORACENTESIS  12/16/2022    IR THORACENTESIS  10/18/2022    IR THORACENTESIS  10/10/2022    IR THORACENTESIS  10/3/2022    IR THORACENTESIS  9/27/2022    IR THORACENTESIS  9/14/2022    IR THORACENTESIS  9/1/2022    IR THORACENTESIS  8/29/2022    SEPTOPLASTY      TONSILLECTOMY         Alergies:  Lisinopril and Simvastatin    Med List:  Current Outpatient Medications   Medication Sig Dispense Refill    albuterol (PROAIR HFA/PROVENTIL HFA/VENTOLIN HFA) 108 (90 Base) MCG/ACT inhaler Inhale 2 puffs into the lungs      alum hydroxide-mag carbonate (GENACTON)  MG/15ML SUSP suspension Take 15 mLs by mouth      Ascorbic Acid 500 MG CHEW Currently taking 100mg Daily.      buPROPion (WELLBUTRIN XL) 150 MG 24 hr tablet TAKE 1 TABLET BY MOUTH EVERY DAY IN THE MORNING      busPIRone (BUSPAR) 10 MG tablet Take 10 mg by mouth      diltiazem ER (DILT-XR) 120 MG 24 hr capsule TAKE 1 CAPSULE BY MOUTH EVERY DAY      famotidine (PEPCID) 20 MG tablet Take 20 mg by mouth 2 times daily      fish oil-omega-3 fatty acids 1000 MG capsule Take 2 g by mouth      furosemide (LASIX) 20 MG tablet Take 20 mg by mouth daily      gabapentin (NEURONTIN) 300 MG capsule Take 1,500 mg by mouth      hydrochlorothiazide (HYDRODIURIL) 25 MG tablet Take 0.5 tablets by mouth daily      HYDROcodone-acetaminophen (NORCO) 5-325 MG tablet Take 1-2 tablets by mouth every 4 hours  as needed for moderate to severe pain 30 tablet 0    hydrOXYzine (ATARAX) 25 MG tablet Take 1 tablet (25 mg) by mouth 3 times daily as needed for itching 20 tablet 0    ibuprofen (ADVIL/MOTRIN) 800 MG tablet Take 1 tablet (800 mg) by mouth every 8 hours as needed for moderate pain (4-6) 50 tablet 0    Lactobacillus (PROBIOTIC ACIDOPHILUS PO)       lamoTRIgine (LAMICTAL) 100 MG tablet TAKE 2 TABLETS BY MOUTH IN THE MORNING AND TAKE 1 TABLET BY MOUTH AT BEDTIME      levothyroxine (SYNTHROID/LEVOTHROID) 137 MCG tablet Take 1 tablet by mouth daily      losartan (COZAAR) 25 MG tablet Take 1 tablet by mouth daily      melatonin 5 MG tablet Take 5 mg by mouth      Multiple Vitamin (MULTIVITAMIN ADULT PO)       omeprazole (PRILOSEC) 20 MG DR capsule Take 20 mg by mouth      Probiotic Product (ALIGN) CAPS Take 1 tablet by mouth daily      Prucalopride Succinate (MOTEGRITY) 2 MG TABS Take 1 tablet by mouth daily      rosuvastatin (CRESTOR) 5 MG tablet Take 1 tablet by mouth daily      senna (SENOKOT) 8.6 MG tablet Take 1 tablet by mouth daily      sildenafil (REVATIO) 20 MG tablet Take 1-5 tablets 1 hour before SA, take on an empty stomach.      tadalafil (CIALIS) 5 MG tablet Take 5 mg by mouth every 24 hours      terbutaline (BRETHINE) 5 MG tablet At 3 hrs of erection , take 1 tab. At 3 1/2 hrs, take an additional tab. At 4 hrs go to ER.      thiamine (B-1) 100 MG tablet Take 100 mg by mouth      vilazodone (VIIBRYD) 40 MG TABS tablet Take 40 mg by mouth      vitamin D3 (CHOLECALCIFEROL) 125 MCG (5000 UT) tablet          Metals Screen: - not formally done, but prior h/o TMS  Yes No Item     Implanted or lodged metals in body     Implanted surgical devices     Metal containing facial or scalp tattoos     Non removable piercings     Seizure Screen  Yes No Item     Current Seizure Disorder?     History of Seizure?     Does patient have a cochlear implant?  denies  Does patient have any shunts?  denies  Does patient have a  "pacemaker?  denies  Does patient have a vagus nerve stimulator?  denies  Does patient have a deep brain stimulator?  denies  Any other implanted device?  denies       Review of Systems                                                                                               (per patient report)  Gen: ENDORSES weight loss (20 lb since 11/2022)  Respiratory: ENDORSES dyspnea    As per HPI, all other systems evaluated and negative except as listed above.     Exam                                                                                                                             /76 (BP Location: Right arm, Patient Position: Sitting, Cuff Size: Adult Regular)   Pulse 68   Temp 97.1  F (36.2  C) (Temporal)   Wt 81.6 kg (180 lb)   SpO2 95%   BMI 23.75 kg/m      Neuro:  Strength: moves all extremities equally and antigravity  Gait: regular base, appropriate rate, normal arm swing, no balance difficulties noted    Mental Status Exam:  Appearance: thin man, appears stated age, hygiene good, grooming good.  clothing appropriate to situation.  Cognition: alert, grossly oriented, conversationally intact, formal testing not done  Behavior: calm and cooperative with interview, answering questions appropriately.  appropriate eye contact.  Motor: no tics or tremor.  no PMR/PMA  Speech: spontaneous and fluent, non-pressured.  Regular rate, rhythm, volume, and tone.   Mood: \"back to where I was\"  Affect: full range, primarily calm to bright, congruent to mood and congruent to situation, stable  Thought Process: linear, logical, goal-oriented  Thought Content: denies PDW/SI/plan, denies HI.  No delusions elicited..   Perceptions: denies AH/VH, does not appear to be responding to internal stimuli  Insight: good  Judgment: good       Labs and Data     Rating Scales:        3/20/2024     9:45 AM 4/3/2024     8:46 AM 4/22/2024     3:00 PM   PHQ   PHQ-9 Total Score 3 12 9   Q9: Thoughts of better off dead/self-harm " past 2 weeks Not at all Several days Not at all           4/19/2024     8:55 AM   CATHRYN-7 SCORE   Total Score 4 (minimal anxiety)   Total Score 4       TIPI Questionnaire:     1. Extraverted, enthusiastic: Agree moderately  2. Critical, quarrelsome: Disagree strongly  3. Dependable, self-disciplined: Agree strongly  4. Anxious, easily upset: Agree a little  5. Open to new experiences, complex: Disagree moderately  6. Reserved, quiet: Disagree a little  7. Sympathetic, warm: Agree strongly  8. Disorganized, careless: Disagree strongly  9. Calm, emotionally stable: Agree moderately  10. Conventional, uncreative: Agree moderately    Totals:  Extraversion:: 5.5  Agreeableness:: 7  Conscientiousness:: 7  Emotional Stability:: 4.5  Openness:: 2    Adverse Childhood Experience Questionnaire for Adults (ACE identified):  Did you feel that you didn't have enough to eat, had to wear dirty clothes, or had no one to protect or take care of you?: (P) No  Did you lose a parent through divorce, abandonment, death, or other reason?: (P) No  Did you live with anyone who was depressed, mentally ill, or attempted suicide?: (P) Yes  Did you live with anyone who had a problem with drinking or using drugs, including prescription drugs?: (P) No  Did your parents or adults in your home ever hit, punch, beat, or threaten to harm each other?: (P) No  Did you live with anyone who went to senior living or USP?: (P) No  Did a parent or adult in your home ever swear at you, insult you, or put you down?: (P) No  Did a parent or adult in your home ever hit, beat, kick, or physically hurt you in any way?: (P) No  Did a parent or adult in your home ever hit, beat, kick, or physically hurt you in any way?: (P) No  Did you experience unwanted sexual contact (such as fondling or oral/anal/vaginal intercourse/penetration)?: (P) No  ACE Identified Total Score: (P) 1  Do you believe that these experiences have affected your health?: (P) Not much     4/1/24  CBC  5.9 > 13.3/40.6 < 251, abs lymphocyte 0.5 [L]  CRP < 0.5  Cr 0.78  ALT 32    3/27/24  CBC 10.5 > 13.6/42.4 < 264, ANC 8.9 [H], abs lymphocyte 0.7 [L]  CRP <0.5  Cr 0.75    3/20/24  CBC 8.6 > 13.4/40.4 < 280, ANC 7.1 [H], abs lymphocyte 0.6 [L]  CRP <0.5  Cr 0.88  ALT 17  T Cell Ratio  Ref Range & Units 1 mo ago   CD3 (T Cells) %  62.1 - 85.0 % 40.2 Low    CD3 (T Cells) Absolute  500 - 2544 / Low    CD3/CD4 (T [place] Cells) %  31.6 - 65.7 % 26.4 Low    CD3/CD4 (T [place] Cells) Absolute  337 - 1687 / Low    CD3/CD8 (T Suppressor/Cytotoxic Cells) %  10.0 - 40.0 % 12.3   CD3/CD8 (T Suppressor/Cytotoxic Cells) Absolute  140 - 907 /UL 67 Low    CD4/CD8 Ratio  0.8 - 3.7 2.1     12/8/23  B12 1112  CRP <0.5    11/24/23  TSH 8.57 [H]    CTH WO (4/3/23):   IMPRESSIONS:   1. No evidence of acute infarction, intracranial hemorrhage, or mass-effect seen. If there is a high clinical index of suspicion for brain mass, evaluation with outpatient contrast-enhanced MRI is recommended.   2. Opacification of the right frontal sinus and bilateral ethmoid air cells are noted. Mild mucosal thickening is seen in the left frontal sinus. Small air-fluid levels are present in the maxillary sinuses bilaterally. Findings may be due to acute and/or chronic sinusitis.     1/17/23: HIV negative       Assessment     This is a 68 year old male who describes years of low-grade depression and anxiety that were not significantly impairing and at least partially related to substance use, continuing until 18 months ago when he developed pleural effusions of unclear etiology and then R phrenic nerve paralysis after a pleurodesis procedure, which has persisted since then with little change despite multiple medication strategies, TMS trial, and IV ketamine (which, although initially beneficial, quickly lost its effect).  This occurs in the setting of several unusual general medical findings that for which there is not a clear cause, including his  "pleural effusions, recurrent pseudomonal infections, and lymphopenia.    The patient presents today for evaluation of depression and discussion of advanced therapeutic options.  Diagnostically, the patient would meet criteria for MDD, recurrent, with a moderate severity episode superimposed on a low grade and perhaps subclinical depressive baseline in the setting of health-related psychosocial stressors and functional impairment.  Differential also includes a component of depression due to general medical condition, with possible contributions from hypothyroidism (high TSH in fall 2023 but not rechecked since increasing Synthroid dose) or related to the multiple unexplained medical symptoms that have developed over the last several years.  Regardless of etiology, both continued medical workup and symptomatic treatment of depression are indicated.    The patient has a well documented failure of adequate trials of >= 4 antidepressants which represent multiple antidepressant classes as well as augmentation therapies.  The patient has completed an adequate dose of individual psychotherapy.  He has undergone both TMS and ketamine with limited response.  Due to remaining profound depression and numerous failed previous treatment modalities, the patient is a candidate for ECT.  We discussed both clinical ECT and the focal ECT \"FEAST\" trial currently ongoing at the , and at this time the patient is most interested in learning more about FEAST.  He has consented for the research team to contact him.  In the mean time, for either form of ECT he will need clearance (likely from the PAC clinic due to respiratory problems and nighttime O2 requirement) and basic labs and EKG as they determine.    The risks, benefits, alternatives and potential adverse effects of the above have been explained.  Alton Bronson agrees to the treatment plan with the ability to do so.  During the course of these treatments, the patient will be asked " not to make any medication changes.  While waiting to initiate these treatments, it is absolutely reasonable to make medication changes, and suggestions (if any) are below.  After treatment is complete, the patient will transfer back to the referring provider - the patient expresses understanding of and agreement to this plan.    The pt knows to call the clinic for any problems or access emergency care if needed.     Diagnoses:  MDD, recurrent, severe  R/o depression due to general medical condition  Relevant General Medical Diagnoses:  Phrenic nerve paralysis  Immunosuppression of Unknown Etiology  Hypothyroidism     Suicide Risk Assessment:    Today Alton Bronson denies ongoing PDW/SI. The patient has few risk factors for self-harm, and this risk is mitigated by no h/o suicide attempt, no plan or intent, no h/o risky impulsive behavior, h/o seeking help when needed, future oriented, none to minimal alcohol use , good social support  , and stable housing. Therefore, based on all available evidence including the factors cited above, he does not appear to be at imminent risk for self-harm.      Medication Risk Assessment:  [place] Prescription Monitoring Program [] was checked today:  indicates that controlled prescriptions have been filled as prescribed.    PSYCHOTROPIC DRUG INTERACTIONS: none clinically relevant       Plan                                                                                                                          Medications:  Continue current outpatient psychotropic medications for now  Talk to your main psychiatrist about stopping the following meds before starting ECT:  Lithium  Lamotrigine  Future directions:  MAOI - although would have to come off Viibryd and Buspar.  Wellbutrin would be okay  Low-dose naltrexone - especially given medical comorbidities, immune dysfunction  Pramipexole - to help with reward system  Psychotherapy:  Continue regular individual  therapy  Procedures:  Plan for ECT - RUL vs. FEAST  Needs PAC Clinic clearance for workup  Patient is amenable to being contacted by FEAST research team  STOP ketamine due to loss of effect  Workup:  TSH reflex to FT4 ordered  Other labs as determined by PAC Clinic      Safety:  Crisis Numbers:   Provided routinely in AVS.  Treatment Risk Statement:  The patient understands the risks, benefits, adverse effects and alternatives. Agrees to treatment with the capacity to do so. No medical contraindications to treatment. Agrees to call clinic for any problems. The patient understands to call 911 or go to the nearest ED if life threatening or urgent symptoms occur.      Care Team   Outpatient Prescriber: Outpatient Therapist: BRYON Psychiatrist: BRYON Therapist: RACHEL completed by: Pharmacist Consult:   MD Hung Dueñas MD N/A [facility] Jeff[facility] Nav[facility] RP   Formulation (primary and secondary factors guiding treatment plan):   Chief concern: depression  Primary diagnosis: MDD  Secondary diagnoses/factors: medical comorbidities including phrenic paralysis   Treatment plan (What are the next 1-3 steps in treatment?)   Elements to consider:  Procedures (ECT, TMS, VNS): ECT  If TMS: Which coil?  N/A    Medications (ketamine, MAOI):  Who will prescribe medication?  Primary psychiatrist  Will pt need dietary counseling?  Not now  Does pt need to taper (e.g., benzo) or stop (e.g., washout) medication?       Stop lithium and lamotrigine before ECT, hold gabapentin    Psychotherapy (BA, PE, CPT, DBT):  Are we recommending BA concurrent to a procedure/medication? Not now  Are we recommending another form of therapy?  No    Are we recommending concurrent/combination treatments?  No    How is waitlist affecting plan?  Stopping certain medications      What ancillary supports/resources/therapies need to be organized by our team?   Does pt need an outpatient psychiatric prescriber?  no  Does pt need to establish  with a therapist?  no  Are we recommending a therapy not provided by our program (e.g. DBT, PE, CPT)?   no     Clinical Global Impression - Severity (at DA) / Improvement (at FU)   Considering your total clinical experience with this particular population, how severely ill is the patient at this time?  Score (1-7): 4   What is the plan and rough timeframe for completing care with [facility] Program?   What is the primary endpoint/goal of treatment?  50% reduction in depression score on objective measure (e.g., PHQ-9), stabilization  Timeframe for completion of this episode of TRD care?  9-12 months    Who will continue outpatient medication management?  Primary psychiatrist  Has this been communicated to pt?  yes  Does a care coordination call with outpatient provider(s) need to be scheduled?  no    Will the pt need ongoing psychotherapy?  no  Will there be ongoing follow-up for ketamine?  no  Has lab monitoring been ordered?  no    Next appointment:  After ECT course       --  Nriav Christiansen MD  Neuromodulation Medicine Fellow, PGY-6  Department of Psychiatry  [facility] [place]  Preferred Contact: Epic Chat      --  Time based billing.  Time on day of service includes:  60min spent face to face with the patient   15 minutes pre-reviewing records  15 minutes preparing consultation note and follow up messages/documentation      Physician Attestation   I, Jann Fleming MD, saw this patient and agree with the findings and plan of care as documented in the note.      Items personally reviewed/procedural attestation: I was present for and supervised the entire visit .    Jann Fleming MD

## 2024-04-22 NOTE — PATIENT INSTRUCTIONS
Today's Recommendations:    - Today we discussed Electroconvulsive therapy(ECT) treatment for you    - If you have decided you definitely are going to do ECT in the next month, please follow the instructions below:     A. Before Starting ECT treatment series: the pre-authorization     You will need to call the Pre-Anesthesia Clinic to complete your medical consultation prior to starting ECT.  Their number is [phone].  If you have trouble getting this scheduled, give us a call and we will see if we can help.     Once you have completed this, we will ask for insurance approval     As soon as we have medical clearance and insurance approval, we will schedule your first ECT session.      B. Once ECT is approved: the treatment    Your visit will take about 2 to 3 hours.  Please wear a loose-fitting or short-sleeved shirt.  Bring a list of your current medications.    Driving: Do not drive for 24 hours after your treatment if you are having only 1 treatment a week. A responsible adult must drive you home and stay with you for 6 hours after your treatment. If you will be having more that 1 treatment within a week, please do not drive until 7 days after your last ECT treatment.    Stop all food, milk, and tobacco at least 8 hours before treatment (this is usually midnight).  Also, refrain from chewing gum or sucking on hard candy. If needed and recommended by your PCP, you can drink very minimal amounts of clear liquids until 2 hours before treatment.  Clear liquids include: water, clear juice, gatorade, clear soda, black coffee or clear tea without milk.         Medication changes before and/or during ECT:     i.Make the following changes in your medication before you start ECT:       - lithium      - lamotrigine     ii. Do not take these medications the evening before an ECT is planned:       - gabapentin      iii.Take these medications in the morning before ECT with a sip of water (take all other morning medications  AFTER ECT): none       In case you were/will be taking medications, the ECT physician will tell you which medicines to take on the morning of treatment.  These often include:  Blood pressure medications  Heart medications (for chest pain or irregular heartbeat)  GERD (acid reflux) medications  Inhalers (bronchodilators)  Long-acting (basal) insulin: take 1/2 of your normal dose.        During ECT treatment period, you may call the ECT area [phone] between 7 AM and 2 PM on Monday, Wednesday and Friday about scheduling/cancelling your appointment issues. At other times, you will have to leave a voice message which will be retrieved the next ECT day. For all clinical questions about ECT, you can call our clinic or text us through [employer] for any questions or concerns.  If you need to change your appointment,  please contact your psychiatrist.     MIL Prepping for ECT: Important tips to help you get the most of this journey     Start using a calendar and notebook or apps on your smartphone to keep track of appointments, conversations,  and other things you need to remember, as this will be more helpful as the ECT continues.      Start an ECT journal to track your progress. Spend a few minutes writing down how you feel now before ECT and why you are doing ECT. Throughout the course of treatments (probably this will be easier on non-ECT days), write what changes you are noticing in your depression, daily activities or side effects of treatment. This can be useful later on in the ECT if you are having some memory loss day-to-day and can't recall how you used to feel. If you have trouble writing this, try recording a video on non-ECT days.describing the above.     Eat healthily, keep a regular sleep schedule, exercise or other physical activity at least on non-ECT days as able. No alcohol or illegal drugs for 24 hours before or after treatment. To be safe, avoid these altogether during the time you are having ECT.      Keep mentally active by reading, doing crossword puzzles or other word games, play video or other games. You can improve your memory for events happening over the previous few days by regularly reviewing things that happened over that period of time which will refresh your memory.      D. Meanwhile, some information about ECT:     - ECT is still one of the most effective treatments for depression on the market.      - There are 2 types of ECT, unilateral (one-sided) and bilateral (two sided). The main differences include:    With bilateral ECT, we can get a faster response (1-2 sessions faster) with increased risk of autobiographical memory loss (where you can forget some moments that might be dear to your heart i.e. it might be spending some time with a loved one)    2.   With Right-sided ECT, we still get a good response in 50 % of cases with less probability of autobiographical memory loss and longer time to response (1-2 sessions slower)     - We would recommend some psychiatric medication treatment within and after ECT treatment period as part of a maintenance plan      -We would also plan some symptomatic treatment medications for any arising side effects including headaches and nausea      -Side effects: besides risk of memory loss, headaches and nausea discussed above, there is a risk of death that is <1 over 50 thousand in probability and that is more attributed to risk of  anesthesia rather than ECT per se. In the past 60 years and to our knowledge, there was NOT any specific report of this side effects from ECT.      - Websites and YouTube videos for more info on ECT:  http://www.theNeverwaretalbeat.ca/news/shedding-light-on-electroconvulsive-therapy-ect  http://www.[facility].org/tests-procedures/electroconvulsive-therapy/basics/definition/prc-99637041  http://[employer].com.com/ect/   "  https://youtu.be/ZYCjz-6iEW4  Https://www.youtube.com/watch?v=QDzu1iv5bhs  Https://www.youtube.com/watch?v=EsvE49UI0Aw  https://www.isen-ect.org/educational-content      Focal Electrically Administered Seizure Therapy (FEAST):     I - Focal Electrically Administered Seizure Therapy (FEAST) is a novel seizure therapy that has been experimentally proven to treat treatment-resistant depression. Its development in 2004 was as a \"refinement\" of Electroconvulsive Therapy (ECT), where an electric stimulus is administered to the brain to cause a seizure and eventually have an antidepressant outcome. This \"refinement\" is seen in  studies that tested \"regular\" FEAST and found that it has significantly lower cognitive side effects on the brain, including decreased confusion and faster time to reorientation after each session.     II - FEAST is known for its 3 different types: \"Regular\" (or direct polarity), Reversed Polarity and Reversed Configuration. Feel free to ask your FEAST physician for further info on the FEAST types.     III - FEAST Vs ECT:   FEAST has a lot in common with Electroconvulsive Therapy, with the exception of a few points:              1- The two paddles used to deliver the electrical stimulus have a different shape, which allows for electricity to produce highly focal current flow. It is thought that this plays an important role in decreasing the cognitive side effects. The smaller paddle is placed anteriorly  with the lower boundary just above the center of the right eyebrow, and the larger paddle to the right of the most superior point of the scalp (the vertex)              2- The current between the two paddles is unidirectional (compared to bidirectional in ECT)              3- FEAST is slower to kick in as far antidepressant effect - might take 11 (plus or minus 2) treatments, instead of 9 plus or minus 2 treatments for unilateral ECT, and 7 plus or minus 2 for bilateral ECT              4- " Once reached, attains virtually the same benefit that ECT does. Important to note that ENBZN-Ca-JPK studies are still in progress and an evidenced based comparison is still not available.     5- Significantly decreased side effects, specifically memory problems              6- Significantly decreased time to recovery after waking up from anesthesia               7- FEAST Is studied much less than ECT as it is a relatively recent discovery, so long-term side effects are still unknown   8 - Your FEAST visit will take about 2 to 3 hours + 4 x (1-2 hours) visits on OFF days to complete a few depression assessments.                9. If there is no improvement with FEAST, we can discuss options including increasing the dose of the stimulus, switching to ECT or stopping the treatment.        As you are interested in FEAST, we agreed to have someone contact you from the research team to explain further and see if you would be a good candidate for the FEAST study. This study is conducted by the Interventional Psychiatry Research Lab. Dr. Jann Fleming is the PI. You may contact the study team at [phone] or [email or web address]. The primary  is Afsaneh Vigil.        - We talked a bit about other future options to consider, such as trying one of the older antidepressants (called an MAOI) or adding an old Parkinson's medication called pramipexole - information on both is below    - We will plan to see you back in the office after your ECT or FEAST course to check in.    Monoamine oxidase inhibitors (MAOIs):  This is an older class of antidepressant that is not easy to take. MAOIs have substantial side effects: sleep disruption, blood pressure changes (especially leading to dizziness), sometimes nausea, and sometimes over-sedation. They are also very dangerous in overdose.  However, they are also uniquely effective because unlike other antidepressants, they have major effects on dopamine systems, a neurotransmitter  "involved in motivation and drive.  Some reasonable accessible articles for more information:  https://www.[facility].org/diseases-conditions/depression/in-depth/maois/art-10509736  https://www.psychiatrist.com/jcp/depression/xjmz-ifbsvayps-zwxbzkj-inhibitors-depression-treatment/    MAOIs come in two forms: a skin patch (brand name EmSam) and oral. Some people believe the skin patch is less effective, and it is often not covered by insurance.  The oral versions have a significant risk: in combination with certain foods, they can rapidly raise blood pressure and be fatal (\"hypertensive crisis\"). To prevent that, they come with diet restrictions:  https://www.[facility].org/diseases-conditions/depression/expert-answers/maois/faq-75294270  https://www.health.qld.gov.au/__data/assets/pdf_file/0020/983238/oncol_maoi.pdf  Because this is an ordinary medication (not an intervention), an MAOI is something you'd do with your primary psychiatrist. If they want some guidance/help with how to prescribe we can offer that.     Pramipexole  We discussed trying a medication called pramipexole (brand name Mirapex). It is originally developed for Parkinson's disease, but it has activity on the brain's dopamine circuits, which are involved in our sense of reward and motivation. In your case, because problems in motivation and pleasure are a large part of your symptoms, I think it could be helpful.   In our experience, maybe 1/4 of patients who try pramipexole have some benefit.   The thing to know is that some patients (maybe 10%, as a guess) of patients who take this medication will become impulsive -- spending more, gambling, being more sexual, etc. If you do start it, warn a friend or loved one that your behavior may change, so they can keep an eye on you.  There are other side effects (insomnia, some folks report nausea, some report jitteriness), but they tend to be much milder.      --      Research  The \"Measurement Based Care\" " "research study is being conducted throughout the [facility] Department of Psychiatry and Behavioral Sciences. This provides some additional testing that might be diagnostically helpful or may help us to know what treatments are better suited to different mental health symptoms. This study will enable us how to better incorporate testing into clinical care.    More information about this study is at: https://betty.[facility]/behavioral-measurement-based-care-psychiatry-bmcpposter    If you are interested in the study you can email, [email or web address].    If you do not want to be contacted about research, please let us know. (Being called does not mean you have to be in a  study.)    Our clinic is also conducting clinical trials of new brain stimulation treatments. Other studies are what we would call \"biomarker\" studies, where we ask you to participate in some kind of measurement while you are undergoing regular treatment. You may be called by one of our clinical research coordinators about these studies.      General Information:  Our Treatment-Resistant Disorders/Interventional Psychiatry clinic is what is often called a \"consultation\" or \"tertiary care\" clinic. That means we do not see patients long-term for medication management or talk therapy. We offer thorough evaluations, recommendations about medications/therapies you may have not yet tried, and in some cases, brain stimulation or office-based treatments. If you are likely to benefit from one of those advanced treatments, we will have talked about it today. Once that treatment is complete, we will see you once or twice afterwards to check in, and then you will return to getting ongoing psychiatric care from whomever you were seeing before you came for your evaluation with us. If for some reason you no longer have access to that clinician, we can help with a referral to our main [facility]. The only patients we see long-term are patients with " implanted medical devices that require ongoing monitoring and programming.     Our recommendations almost always include some kind of cognitive-behavioral therapy (CBT) if you are not getting it already. Brain and nerve stimulation treatments work best when combined with certain talk therapies. We make these recommendations because we strongly believe that, without the therapy piece, most people will not get better, or will have limited clinical benefit. It is often difficult or inconvenient to add therapy to an already busy schedule, but it is also necessary.    It is important to remember that, like all mental health treatments, our interventional therapies are not perfect. About one third of people will not feel better after treatment, and even when they work, they do not take away the symptoms entirely.If we have recommended something above, it means we think there is a better than even chance of it working, but things are never guaranteed. This is another reason we usually recommend CBT along with our advanced treatments -- it can address the symptoms that remain after the stimulation/ketamine treatment.       While we are waiting to implement the recommendations you and I have discussed, you should know what to do if your symptoms worsen. A variety of crisis numbers/resources are available. They include:    CRISIS GENERAL NUMBERS   4-041-ZGLJFXC (4-819-832-4097)  OR  911     CRISIS INTERVENTION TEAM (CIT) - this is a POLICE UNIT, specially trained.  This website has information for all of [place]'s CITs. Lays out which areas have this team.  Http://cit.[facility]/citmap/[place].php  However, one can just call 911 and ask for this special team.   If police need to be called, DO ask for this team.    CRISIS MOBILE TEAMS  [and see end of this phrase*]  [facility]: 24hrs/7days:    [phone]  (Adults > 19yo)    [phone]  (Child   < 18yo)                                       FRONT DOOR  "(during the day could call)   [phone]    [facility] -[phone] (Adult)  [phone] (Child)     [facility] -[phone] (Adult and Child)     [facility] -[phone] ([employer] mobile crisis team; Adult and Child; 24hr)     [facility] -[phone] (Adult and Child)     CRISIS HOUSING  [facility]                           [address]              [phone]  [facility]                                [address]                       [phone]  [facility]                               [address]     [phone]   [facility]  [address]                [phone]    [place] EMERGENCY NUMBERS    Crisis Connection:                                [phone]  [facility]:     [phone]  Crisis Intervention:                                [phone] or [phone]   COPE: Mobile Team 24hrs/7days:    [phone]  (Adults > 17yo)                                                                            [phone]  (Child   < 16yo)  [facility]        [phone] 24/7 line and Mobile Team   [address] [place] [address]  DROP IN:  M-F: 8:00 am - 7:00 pm  Sat: 11:00 am - 3:00 pm   Sun: Closed     WALK-IN COUNSELING:  [facility]       [phone]    [address]:   M, W, F:  1:00-3:00PM    M-Th:  6:30-8:30PM    TRANS and LGBT  Call [employer] at [phone]. This service is staffed by trans people 24/7.   LGBT youth <24 contemplating suicide, call the [facility] [phone].    POISON CONTROL CENTER  [phone]    OR  go to nearest ER    CHILD  \"[facility] has a needs assessment team who will do an intake evaluation. Based on the results of the intake they will recommended " "inpatient admission, partial hospitalization, intensive outpatient or outpatient care. The number is [phone]. \"    CRISIS TEXT LINE  Http://www.crisistextline.org  FREE SUPPORT AT YOUR FINGERTIPS,   Crisis Text Line serves anyone, in any type of crisis, providing access to free,  support and information via the medium people already use and trust: text. Here s how it works:  1)  Text 139196 from anywhere in the [place], anytime, about any type of crisis.  2)  A live, trained Crisis Counselor receives the text and responds quickly.      The volunteer Crisis Counselor will help you move from a 'hot moment to a cool moment'    [facility] EMERGENCY NUMBERS    Crisis Connection:                                [phone]  [facility]:              [phone]  Crisis Intervention:                                [phone] or [phone]   COPE: Mobile Team 24hrs/7days:    [phone]  (Adults > 19yo)                                                                            [phone]  (Child   < 16yo)  [facility]        [phone]  CALL 24 hours a day.  [address] [place] [address]  DROP IN:  M-F: 8:00 am - 7:00 pm  Sat: 11:00 am - 3:00 pm   Sun: Closed    WALK-IN COUNSELIN)  [facility]                                  [phone]        [address]:                  M, W:      5:00-7:00PM       2)  [facility]                            [phone]        [place], [address]                T, Th:      6:00-8:00PM    TRANS and LGBT  Call [employer] at [phone]. This service is staffed by trans people .   LGBT youth <24 contemplating suicide, call the [employer] [phone].    POISON CONTROL CENTER  [phone]    OR  go to nearest ER    CHILD  \"[facility] has a needs assessment team who will do an intake evaluation. Based on the results of the intake they will " "recommended inpatient admission, partial hospitalization, intensive outpatient or outpatient care. The number is [phone] or [phone]. \"    CRISIS TEXT LINE  Http://www.crisistextline.org  FREE SUPPORT AT YOUR FINGERTIPS, 24/7  Crisis Text Line serves anyone, in any type of crisis, providing access to free, 24/7 support and information via the medium people already use and trust: text. Here s how it works:  1)  Text 291-254 from anywhere in the [place], anytime, about any type of crisis.  2)  A live, trained Crisis Counselor receives the text and responds quickly.      The volunteer Crisis Counselor will help you move from a 'hot moment to a cool moment'      * A Community Paramedic (CP) is an advanced paramedic that works to increase access to primary and preventive care and decrease use of emergency departments, which in turn decreases health care costs. Among other things, CPs may play a key role in providing follow-up services after a hospital discharge to prevent hospital readmission. CPs can provide health assessments, chronic disease monitoring and education, medication management, immunizations and vaccinations, laboratory specimen collection, hospital discharge follow-up care and minor medical procedures. CPs work under the direction of an Ambulance Medical Director.    "

## 2024-04-30 ENCOUNTER — TELEPHONE (OUTPATIENT)
Dept: PSYCHIATRY | Facility: CLINIC | Age: 69
End: 2024-04-30

## 2024-04-30 NOTE — TELEPHONE ENCOUNTER
Writer returned call to patient.    Patient reports that he saw Dr. Fleming on 4/22.  He states that he was recommended to have ECT with Dr. Fleming.     He has a couple of concerns regarding ECT.  One of the big concerns is not being able to drive during the course of ECT.  He states that his depression does get much worse when he is socially isolated.  He also had some concerns regarding cognitive side effects.  We did discuss FEAST vs. ECT.  Patient reports that research team has reached out to him.         He states that one thing that is difficult about his depression is that he often times cannot tell how he is doing, however he reports both wife and therapist told him that he was doing better while on Ketamine.  He also reports some mild success with TMS but states it was pretty limited.  TMS was done in the beginning of 2023.     Patient reports that [facility] is suggesting that he try TMS again while doing ketamine at the same time.  They think that some of his medical issues that happened during TMS limited the effectiveness of it.      Patient reports that while [facility] has been helpful he has more confidence in Dr. Fleming and would like his opinion on if trying Ketamine/TMS again together would be worth it.        Routing to Dr. Fleming and Dr. Christiansen

## 2024-04-30 NOTE — TELEPHONE ENCOUNTER
What is the concern that needs to be addressed by a nurse?   Alton has Follow up questions regarding his last visit. He was recommended ECT. He would like to speak to a nurse or Dr. Fleming about what other treatment might be available.  May a detailed message be left on voicemail? Yes     Date of last office visit: 4/22/24    Message routed to: Dr. Fleming

## 2024-05-06 NOTE — TELEPHONE ENCOUNTER
Writer called Yannick back to discuss provider response.  Received voicemail.  Left message asking for a return call.  Clinic number provided.

## 2024-05-06 NOTE — TELEPHONE ENCOUNTER
Writer returned call to patient.  He does verbalize understanding, but is wondering what our providers think of him doing TMS one more time.  He notes he had a lot of stressors during his last course of TMS and is wondering if this would be a better option as ECT is much more involved.  He has spoken to the [facility] and they have told him insurance has authorized another course.

## 2024-05-07 NOTE — TELEPHONE ENCOUNTER
Writer returned call to patient.  We reviewed message.  He verbalized understanding and stated he would continue to think about options.

## 2024-06-02 ENCOUNTER — HEALTH MAINTENANCE LETTER (OUTPATIENT)
Age: 69
End: 2024-06-02

## 2024-09-12 NOTE — TELEPHONE ENCOUNTER
FUTURE VISIT INFORMATION      SURGERY INFORMATION:  pre-op for ECT, date TBD with Dr. Fleming   Consult: ov 24    RECORDS REQUESTED FROM:       Primary Care Provider: Alhaji López MD - [facility]    Most recent EKG+ Tracin24- [facility]    Most recent Sleep Study:   23- [facility]

## 2024-09-19 ENCOUNTER — PRE VISIT (OUTPATIENT)
Dept: SURGERY | Facility: CLINIC | Age: 69
End: 2024-09-19

## 2024-09-20 ENCOUNTER — TELEPHONE (OUTPATIENT)
Dept: PSYCHIATRY | Facility: CLINIC | Age: 69
End: 2024-09-20

## 2024-09-20 NOTE — TELEPHONE ENCOUNTER
Writer called patient to check in as pre-op was cancelled yesterday.  Received voicemail.  Left message asking for a return call.  Clinic number provided.

## 2024-09-20 NOTE — TELEPHONE ENCOUNTER
Patient states he did not do the pre-op due to doctor prescribing memantine and would like to see if this is effective before going to ECT.

## 2025-01-16 ENCOUNTER — TRANSFERRED RECORDS (OUTPATIENT)
Dept: HEALTH INFORMATION MANAGEMENT | Facility: CLINIC | Age: 70
End: 2025-01-16
Payer: COMMERCIAL

## 2025-01-23 ENCOUNTER — TRANSFERRED RECORDS (OUTPATIENT)
Dept: HEALTH INFORMATION MANAGEMENT | Facility: CLINIC | Age: 70
End: 2025-01-23
Payer: COMMERCIAL

## 2025-02-03 ENCOUNTER — ALLIED HEALTH/NURSE VISIT (OUTPATIENT)
Dept: PULMONOLOGY | Facility: CLINIC | Age: 70
End: 2025-02-03
Payer: COMMERCIAL

## 2025-02-03 DIAGNOSIS — J90 PLEURAL EFFUSION: ICD-10-CM

## 2025-02-03 DIAGNOSIS — J33.9 NASAL POLYP: ICD-10-CM

## 2025-02-03 DIAGNOSIS — A49.8 PSEUDOMONAS INFECTION: Primary | ICD-10-CM

## 2025-02-03 PROCEDURE — 96041 GENETIC COUNSELING SVC EA 30: CPT

## 2025-02-03 NOTE — PROGRESS NOTES
[facility]  Genetic Counseling Consultation    Presenting Information  Alton Bronson is a 69 year old-year-old male with a history of chronic cough and Pseudomonas colonization.  Alton was referred by Dr. Stanford Jolly, and seen today for genetic counseling at [facility] for genetic counseling and a sweat chloride test. I met with Alton in-person to obtain a personal and family history, discuss the genetics and inheritance of cystic fibrosis, and to discuss sweat chloride test results. While cystic fibrosis is typically diagnosed in early childhood due to pulmonary symptoms and failure to thrive, there are also patients with milder disease that may go undiagnosed for many years.  Because of Alton's symptoms, an evaluation for cystic fibrosis was recommended.      Summary & Plan  The genetics and inheritance of cystic fibrosis was discussed briefly today.  Alton's sweat test today was negative. Based on the sweat test results, we would not expect him to have CF.  Pulmonary follow-up as recommended by care team.   I am happy to help initiate CFTR genetic testing in the future if this is ever recommended for Alton by his pulmonary team.  My number was given for questions or concerns that arise.    Personal History  Alton has a complicated pulmonary history, including yellow nail syndrome, chronic cough, pleural effusion, pseudomonas colonization, sinusitis, and nasal polyps. He was seen by Stewart Christianson at [facility] pulmonology on 12/30/2024. He notes that he had VATS pleurodesis in October 2022, then developed pleural effusions on both right and left. He had surgical plication in 2023 at [place], and due complications with diaphragmatic herniation, required repeat surgery. He has had a chest tube (Pleurx) on the right since that time. When he had a PICC line for targeted antibiotics for Pseudomonas in his sinuses, he noted a large improvement in his cough. He was seen  by allergy recently due to suspicion for an immunodeficiency and lymphopenia, but overall suspicion was low and labs reassuring. Given his history, he was referred for a CF evaluation.     Family History  A three generation pedigree was taken today and sent to be scanned into the EMR. The family history was significant for the following:  Alton had a younger brother, Neymar, who  by suicide at age 18. Alton has an adopted sister as well.   Alton has two sons, ages 32 and 30. His eldest son recently was found to have elevated cholesterol.  Alton's mother  due to complications of dementia in her 80s. She had a history of a stroke in her 50s.   Alton had a maternal aunt who  due to complications of dementia in her 70s. He had three children who are well.   Alton's maternal grandparents  at older ages with no major health concerns.  Alton's father  at age 84 from an MI. He had a history of Grave's disease and prostate cancer at age 65.  Alton's father had nine siblings. They have all passed away, but he reports not major health concerns for them during their lives. His paternal cousins are healthy.  Alton's paternal grandfather  at a younger age, but he does not know the cause. His paternal grandmother  in old age with no major health concerns throughout her life.    The family history was negative for known CF, genetic conditions, lung disease, pancreatitis, infertility, birth defects, and DD/ID.      Discussion  Cystic Fibrosis  Genes are long stretches of DNA that are responsible for how our bodies look and how our bodies work. We all have two copies of every gene, one inherited from our mother and one inherited from our father. When there is a change, called a mutation, in a gene it can cause it to not do its job correctly which can cause the signs and symptoms of a genetic condition.      Cystic fibrosis (CF) causes a person to produce thick, sticky mucus due to an imbalance of salt and  water in and out of the cells. This affects the lungs, sinuses, digestive system, and male reproductive system, among other areas of the body. Children who are diagnosed with cystic fibrosis need respiratory therapy several times a day, and may need to take pills to help their body to digest food. However, it is important to remember that there can be great variation in the symptoms that a person may have. There is great variability in symptoms in individuals with CF, where some individuals have very mild symptoms or are only diagnosed in adulthood as a result of an infertility work-up, while others have significant symptoms as infants and are diagnosed soon after birth. We can think of this as a spectrum. Genetic testing, sweat chloride testing, and someone's personal and family history all can help assess where someone may be in this spectrum, though we are not able to predict exactly what someone's presentation may look like or what exact symptoms they will develop when.      Inheritance of CF  CF, and CFTR-related disorders, are caused by mutations in a gene called CFTR.  CF is inherited in an autosomal recessive pattern.  This means that to be affected an individual must inherit a mutation in both copies of the CFTR gene (one from each parent). These exact mutations can be more severe or mild, which can affect presentation/severity for an individual.      Individuals with just one mutation in the CFTR gene are said to be carriers.  Carriers do not have cystic fibrosis (and would not have CF symptoms) but can have a child with CF if their partner is also a carrier or has a CFTR disorder themselves.  When two parents are carriers, then with each pregnancy together, there is a 25% chance to have a child with cystic fibrosis, a 50% chance to have a child that is an unaffected carrier, and a 25% chance to have a child that is an unaffected non-carrier. No one has control over which copy they pass on to their child  since it is a random process. These exact chances change if one parent personally has a CFTR disorder. Approximately 1 in 30 people in the [place] are carriers of cystic fibrosis.       Sweat Test   Most people with two CFTR mutations have cystic fibrosis. The diagnosis of cystic fibrosis is made by a sweat chloride test of >60 mmol/L (normal range <30 mmol/), and/or the presence of two CF-causing mutations in the CFTR gene.  Because of Alton's personal history, he was seen today for a sweat chloride test to determine if CF, or a CFTR-related disorder, could be the cause of his history or not. The process and possible results of the sweat test were reviewed in detail, including the possibility of a positive (>60), negative (<30), or borderline (30-59) result.      Alton's sweat chloride test returned in the normal range today 23 mmol/L chloride (normal range <30 mmol/L). This means it is unlikely that Alton has CF, and we would not expect CF to be the explanation for his symptoms.     We did review that, typically, we like to get enough sample to run the test multiple times. For Alton, we were only able to get one value. Given this was not close to a cut-off, I feel comfortable classifying it as a true negative. I offered a repeat sweat test to see if we can get more sweat/sample. Alton declined at this time in agreement with the negative.      We briefly discussed that genetic testing for the CFTR gene is technically also available for Alton ever helpful to further rule out CF, though quite unlikely for Alton  based on his normal sweat chloride. If this is ever recommended for Alton by his Pulmonary team and/or if any new symptoms arise, I am happy to help initiate CFTR genetic testing.     It was a pleasure to meet with Alton today. Alton had no additional questions at this time. Contact information was shared for any future questions or concerns that arise.       Rex Kumari MS, [facility]  Genetic Counselor  The  [place] [facility]  [facility], [place]  Phone: [phone]      60 minutes spent on the date of the encounter in chart review, patient visit, test coordination/review, documentation, and/or discussion with other providers about the issues documented above.

## 2025-02-07 ENCOUNTER — MEDICAL CORRESPONDENCE (OUTPATIENT)
Dept: HEALTH INFORMATION MANAGEMENT | Facility: CLINIC | Age: 70
End: 2025-02-07
Payer: COMMERCIAL

## 2025-02-10 ENCOUNTER — TRANSCRIBE ORDERS (OUTPATIENT)
Dept: OTHER | Age: 70
End: 2025-02-10

## 2025-02-10 DIAGNOSIS — M47.812 CERVICAL SPONDYLOSIS WITHOUT MYELOPATHY: Primary | ICD-10-CM

## 2025-02-12 DIAGNOSIS — M47.812 SPONDYLOSIS OF CERVICAL REGION WITHOUT MYELOPATHY OR RADICULOPATHY: Primary | ICD-10-CM

## 2025-03-04 ENCOUNTER — ANCILLARY PROCEDURE (OUTPATIENT)
Dept: GENERAL RADIOLOGY | Facility: CLINIC | Age: 70
End: 2025-03-04
Attending: ORTHOPAEDIC SURGERY
Payer: COMMERCIAL

## 2025-03-04 ENCOUNTER — OFFICE VISIT (OUTPATIENT)
Dept: ORTHOPEDICS | Facility: CLINIC | Age: 70
End: 2025-03-04
Payer: COMMERCIAL

## 2025-03-04 VITALS — BODY MASS INDEX: 24.92 KG/M2 | WEIGHT: 188 LBS | HEIGHT: 73 IN

## 2025-03-04 DIAGNOSIS — M47.812 SPONDYLOSIS OF CERVICAL REGION WITHOUT MYELOPATHY OR RADICULOPATHY: ICD-10-CM

## 2025-03-04 DIAGNOSIS — M47.812 CERVICAL SPONDYLOSIS WITHOUT MYELOPATHY: ICD-10-CM

## 2025-03-04 PROCEDURE — 99204 OFFICE O/P NEW MOD 45 MIN: CPT | Performed by: ORTHOPAEDIC SURGERY

## 2025-03-04 PROCEDURE — 1125F AMNT PAIN NOTED PAIN PRSNT: CPT | Performed by: ORTHOPAEDIC SURGERY

## 2025-03-04 PROCEDURE — 77073 BONE LENGTH STUDIES: CPT | Performed by: STUDENT IN AN ORGANIZED HEALTH CARE EDUCATION/TRAINING PROGRAM

## 2025-03-04 PROCEDURE — 72082 X-RAY EXAM ENTIRE SPI 2/3 VW: CPT | Performed by: STUDENT IN AN ORGANIZED HEALTH CARE EDUCATION/TRAINING PROGRAM

## 2025-03-04 RX ORDER — OMEGA-3/DHA/EPA/FISH OIL 300-1000MG
1 CAPSULE ORAL DAILY
COMMUNITY

## 2025-03-04 RX ORDER — TRIAMTERENE AND HYDROCHLOROTHIAZIDE 37.5; 25 MG/1; MG/1
1 CAPSULE ORAL DAILY
COMMUNITY
Start: 2025-01-31

## 2025-03-04 RX ORDER — HYDRALAZINE HYDROCHLORIDE 10 MG/1
0.5 TABLET, FILM COATED ORAL
COMMUNITY
Start: 2024-12-20

## 2025-03-04 RX ORDER — SENNOSIDES 8.6 MG
1300 CAPSULE ORAL 2 TIMES DAILY
COMMUNITY

## 2025-03-04 RX ORDER — MEMANTINE HYDROCHLORIDE 5 MG-10 MG
1 KIT ORAL 2 TIMES DAILY
COMMUNITY

## 2025-03-04 RX ORDER — MULTIVITAMIN
1 TABLET ORAL DAILY
COMMUNITY

## 2025-03-04 RX ORDER — DILTIAZEM HYDROCHLORIDE 240 MG/1
240 CAPSULE, EXTENDED RELEASE ORAL DAILY
COMMUNITY
Start: 2025-02-03

## 2025-03-04 RX ORDER — SODIUM CHLORIDE FOR INHALATION 3 %
3 VIAL, NEBULIZER (ML) INHALATION 2 TIMES DAILY
COMMUNITY
Start: 2024-10-07

## 2025-03-04 RX ORDER — TRAZODONE HYDROCHLORIDE 50 MG/1
50 TABLET ORAL AT BEDTIME
COMMUNITY

## 2025-03-04 NOTE — PROGRESS NOTES
In-Person Visit    REASON FOR CONSULTATION: Consult (Cervical spondylosis without myelopathy/Dr. Lul Kim at [facility]/[employer]/MRI done 01/16/25 at [facility] 02/11/25)     REFERRING PHYSICIAN: Lul Kim  PCP:Alhaji López      History of Present Illness:  69 year old male, referred by Dr. Lul Kim (spine surgeon, [facility]) for cervical spondylosis C3-C6; severe R foraminal stenosis C3-4 with questionable Right C4 radiculopathy.  Per 1/23/25 clinci note (Jean Hines PA-C, [facility]), plan was for Right C4 SNRB then follow-up with Dr. 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 Patient was referred to our institution to establish care due to high likelihood of requiring multidisciplinary help with his medical comorbidity. He refers that he has tried physical therapy int he past with no much help and has had injections documented below that have had short lasting relief at best.   Denies any difficulty with ambulation and no loss of fine motor control on hands. No pain going down his arms and no weakness.       Neck 100%, Arm 0%,  Right sided mostly  Worse: Motion.   Better: Rest    Previous treatment:   Has had Physical therapy in the past    Previous Injections:  4/10/24 - Cervical RFA (Dr. Dante Escalante, [facility]).  6/3/24 - C7-T1 IL OPAL (Dr. Escalante, [facility]).  11/11/24 - C7-T1 IL OPAL (Dr. Escalante, [facility]).  Right C4 SNRB Cancelled      Oswestry (KIMBERLYN) Questionnaire         No data to display                     Neck Disability Index (NDI) Questionnaire         No data to display                          PROMIS-10 Scores             ROS:  A 12-point review of systems  was completed and is negative except for otherwise noted above in the history of present illness.    Med Hx:  Past Medical History:   Diagnosis Date    ADD (attention deficit disorder)     Anxiety     Depression     Gastroesophageal reflux disease with esophagitis     Gastroparesis     Hypertension     Osteoarthritis     Pleural effusion     Uncomplicated asthma        Surg Hx:  Past Surgical History:   Procedure Laterality Date    ARTHRODESIS TOE(S) Right 11/28/2022    Procedure: RIGHT FIRST METATARSOPHALANGEAL JOINT ARTHRODESIS,;  Surgeon: Alhaji Louise MD;  Location: [facility] OR    BUNIONECTOMY Right 11/28/2022    Procedure: RIGHT BUNIONETTE REPAIR;  Surgeon: Alhaji Louise MD;  Location: [facility] OR    GASTRIC FUNDOPLICATION      HERNIA REPAIR      IR PICC PLACEMENT > 5 YRS OF AGE  3/20/2024    IR PICC PLACEMENT > 5 YRS OF AGE  3/22/2024    IR PICC PLACEMENT > 5 YRS OF AGE  3/25/2024    IR THORACENTESIS  10/10/2023    IR THORACENTESIS  9/26/2023    IR THORACENTESIS  9/18/2023    IR THORACENTESIS  9/6/2023    IR THORACENTESIS  8/29/2023    IR THORACENTESIS  8/18/2023    IR THORACENTESIS  8/7/2023    IR THORACENTESIS  7/27/2023    IR THORACENTESIS  7/17/2023    IR THORACENTESIS  1/12/2023    IR THORACENTESIS  12/29/2022    IR THORACENTESIS  12/23/2022    IR THORACENTESIS  12/16/2022    IR THORACENTESIS  10/18/2022    IR THORACENTESIS  10/10/2022    IR THORACENTESIS  10/3/2022    IR THORACENTESIS  9/27/2022    IR THORACENTESIS  9/14/2022    IR THORACENTESIS  9/1/2022    IR THORACENTESIS  8/29/2022    SEPTOPLASTY      TONSILLECTOMY         Allergies:  Allergies   Allergen Reactions    Levofloxacin Other (See Comments)     Tendonitis    Lisinopril      Allergist suggested lisinopril might be adding to his cough every, 2018.  Started losartan in its place but could try lisinopril again in the future as a trial if desired.    Simvastatin      PN: LW Reaction: myalgias at 80mg       Meds:  Current Outpatient Medications    Medication Sig Dispense Refill    albuterol (PROAIR HFA/PROVENTIL HFA/VENTOLIN HFA) 108 (90 Base) MCG/ACT inhaler Inhale 2 puffs into the lungs      alum hydroxide-mag carbonate (GENACTON)  MG/15ML SUSP suspension Take 15 mLs by mouth      Ascorbic Acid 500 MG CHEW Currently taking 100mg Daily.      buPROPion (WELLBUTRIN XL) 150 MG 24 hr tablet TAKE 1 TABLET BY MOUTH EVERY DAY IN THE MORNING      busPIRone (BUSPAR) 10 MG tablet Take 10 mg by mouth      diltiazem ER (DILT-XR) 120 MG 24 hr capsule TAKE 1 CAPSULE BY MOUTH EVERY DAY      famotidine (PEPCID) 20 MG tablet Take 20 mg by mouth 2 times daily      fish oil-omega-3 fatty acids 1000 MG capsule Take 2 g by mouth      furosemide (LASIX) 20 MG tablet Take 20 mg by mouth daily      gabapentin (NEURONTIN) 300 MG capsule Take 1,500 mg by mouth      hydrochlorothiazide (HYDRODIURIL) 25 MG tablet Take 0.5 tablets by mouth daily      HYDROcodone-acetaminophen (NORCO) 5-325 MG tablet Take 1-2 tablets by mouth every 4 hours as needed for moderate to severe pain 30 tablet 0    hydrOXYzine (ATARAX) 25 MG tablet Take 1 tablet (25 mg) by mouth 3 times daily as needed for itching 20 tablet 0    ibuprofen (ADVIL/MOTRIN) 800 MG tablet Take 1 tablet (800 mg) by mouth every 8 hours as needed for moderate pain (4-6) 50 tablet 0    Lactobacillus (PROBIOTIC ACIDOPHILUS PO)       lamoTRIgine (LAMICTAL) 100 MG tablet TAKE 2 TABLETS BY MOUTH IN THE MORNING AND TAKE 1 TABLET BY MOUTH AT BEDTIME      levothyroxine (SYNTHROID/LEVOTHROID) 137 MCG tablet Take 1 tablet by mouth daily      losartan (COZAAR) 25 MG tablet Take 1 tablet by mouth daily      melatonin 5 MG tablet Take 5 mg by mouth      Multiple Vitamin (MULTIVITAMIN ADULT PO)       omeprazole (PRILOSEC) 20 MG DR capsule Take 20 mg by mouth      Probiotic Product (ALIGN) CAPS Take 1 tablet by mouth daily      Prucalopride Succinate (MOTEGRITY) 2 MG TABS Take 1 tablet by mouth daily      rosuvastatin (CRESTOR) 5 MG tablet  "Take 1 tablet by mouth daily      senna (SENOKOT) 8.6 MG tablet Take 1 tablet by mouth daily      sildenafil (REVATIO) 20 MG tablet Take 1-5 tablets 1 hour before SA, take on an empty stomach.      tadalafil (CIALIS) 5 MG tablet Take 5 mg by mouth every 24 hours      terbutaline (BRETHINE) 5 MG tablet At 3 hrs of erection , take 1 tab. At 3 1/2 hrs, take an additional tab. At 4 hrs go to ER.      thiamine (B-1) 100 MG tablet Take 100 mg by mouth      vilazodone (VIIBRYD) 40 MG TABS tablet Take 40 mg by mouth      vitamin D3 (CHOLECALCIFEROL) 125 MCG (5000 UT) tablet        No current facility-administered medications for this visit.       Fam Hx:  No family history on file.    P/S Hx:  Social History     Tobacco Use    Smoking status: Former     Types: Cigarettes     Passive exposure: Past    Smokeless tobacco: Never    Tobacco comments:     \"Quit smoking when I was 29\"   Substance Use Topics    Alcohol use: Not Currently         Physical Exam:  Very pleasant, healthy appearing, alert, oriented x 3, cooperative.  Normal mood and affect.  Not in cardiorespiratory distress.  There were no vitals taken for this visit.  Normal upright posture.    Normal gait without assistive device.  No antalgia / imbalance.    No gross spinal deformity, no skin lesions or surgical scars.  Localizes pain at Posterior neck and trapezial area mostly on his Right side.   Tenderness: (-) midline, (-) paraspinal, (-) R and L PSIS.  ROM:   Elicits pain.     Neuro Exam:  Motor: 5/5  Sensory:  Normal.         Imaging:        Cervical AP LAT Xrays+ FLEX EX  -Patient with High T1 slope with lordosis of his cervical spine.   Multilevel diffuse degenerative disease with preserved lordotic alignment.     T1 slope 46  C2 slope 2  C2-T1 lord 44  C2-C7 SVA +1`.7cm    Cervical MRI:   - Multilevel Diffuse diease more notable to the right side of his cervical spine on his C3-4 and C4-5 levels.     Assessment:    Cervical pain and " radiculopathy  Pseudomonal colonization of lungs and sinuses.     Plan:    Patient with above history and physical examination findings. Patient Has a long history of neck pain that has been treated with injections, physical therapy with limited relief. Patients imaging and physical examination findings make it hard to pinpoint which level, Or levels are mostly driving his currently symptoms. Of note patient has High T1 slope that could be driving force of his Cervical hyperlordosis and facet joint overload. This was explained to him but agree that at this point, performing a large deformity correcting surgery when there is already presence of arthritis is not likely to achieve symptomatic relief.     Will refer patient for Right C4 SNRB   Will also order a Cervical CT scan to better be able to visualize pathology in the cervical spine.   Follow up with Virtual visit after injection and CT scan acquisition to discuss imaging findings and Injection results.       - Right C4 SNRB  - Cervical CT scan        Justin Tripp MD  Spine Fellow    Tre Lucas MD    Orthopaedic Spine Surgery  Dept Orthopaedic Surgery, [facility]  [phone] office, [phone] pager  [email or web address]   imaging findings and Injection results.     Justin Tripp MD  Spine Fellow    Attestation:  I (Dr. Tre Lucas - Spine Surgeon) have personally evaluated patient with Spine Fellow Dr. Justin Tripp, and agree with findings and plan outlined in the note, which I also edited.  I discussed at length with the patient/family, explained the nature of spinal condition, and formulated workup and/or treatment plan together.  All questions were answered to the best of my ability and to patient's apparent satisfaction.     >45 minutes spent on the date of the encounter doing chart review/review of outside records/review of test results/interpretation of tests/patient visit/documentation/discussion with other provider(s)/discussion with patient and family.    Tre Lucas MD    Orthopaedic Spine Surgery  Dept Orthopaedic Surgery, [facility]  [phone] office, [phone] pager  [email or web address]

## 2025-03-04 NOTE — LETTER
3/4/2025      Alton Bronson  [address] [place] [address]      Dear Colleague,    Thank you for referring your patient, Alton Bronson, to the [facility] [place]. Please see a copy of my visit note below.    In-Person Visit    REASON FOR CONSULTATION: Consult (Cervical spondylosis without myelopathy/Dr. Lul Kim at [facility]/[employer]/MRI done 01/16/25 at [facility]/DS 02/11/25)     REFERRING PHYSICIAN: Lul Kim  PCP:Alhaji López      History of Present Illness:  69 year old male, referred by Dr. Lul Kim (spine surgeon, [facility]) for cervical spondylosis C3-C6; severe R foraminal stenosis C3-4 with questionable Right C4 radiculopathy.  Per 1/23/25 clinci note (Jean Hines PA-C, [facility]), plan was for Right C4 SNRB then follow-up with Dr. Kim to review response and discuss further options.    Patient however has history of persistent pleural effusions that have been treated at 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 Patient was referred to our institution to establish care due to high likelihood of requiring multidisciplinary help with his medical comorbidity. He refers that he has tried physical therapy int he past with no much help and has had injections documented below that have had short lasting relief at best.   Denies any difficulty with ambulation and no loss of fine motor control on hands. No pain going down his arms and no weakness.     Neck 100%, Arm 0%,  Right sided mostly  Worse: Motion.   Better: Rest    Previous treatment:   Has had Physical therapy in the past    Previous Injections:  4/10/24 - Cervical RFA (Dr. Dante Escalante, [facility]).  6/3/24 - C7-T1 IL OPAL (Dr. Escalante, [facility]).  11/11/24 - C7-T1 IL OPAL (Dr. Escalante, [facility]).  Right  C4 SNRB Cancelled      Neck Disability Index (NDI) Questionnaire         No data to display               No NDI obtained at today's visit.      Visual Analog Pain Scale  Back Pain Scale 0-10: 0  Right leg pain: 0  Left leg pain: 0  Neck Pain Scale 0-10: 4 (rt side neck pain)  Right arm pain: 0  Left arm pain: 0      ROS:  A 12-point review of systems was completed and is negative except for otherwise noted above in the history of present illness.    Med Hx:  Past Medical History:   Diagnosis Date     ADD (attention deficit disorder)      Anxiety      Depression      Gastroesophageal reflux disease with esophagitis      Gastroparesis      Hypertension      Osteoarthritis      Pleural effusion      Uncomplicated asthma        Surg Hx:  Past Surgical History:   Procedure Laterality Date     ARTHRODESIS TOE(S) Right 11/28/2022    Procedure: RIGHT FIRST METATARSOPHALANGEAL JOINT ARTHRODESIS,;  Surgeon: Alhaji Louise MD;  Location: [facility] OR     BUNIONECTOMY Right 11/28/2022    Procedure: RIGHT BUNIONETTE REPAIR;  Surgeon: Alhaji Louise MD;  Location: [facility] OR     GASTRIC FUNDOPLICATION       HERNIA REPAIR       IR PICC PLACEMENT > 5 YRS OF AGE  3/20/2024     IR PICC PLACEMENT > 5 YRS OF AGE  3/22/2024     IR PICC PLACEMENT > 5 YRS OF AGE  3/25/2024     IR THORACENTESIS  10/10/2023     IR THORACENTESIS  9/26/2023     IR THORACENTESIS  9/18/2023     IR THORACENTESIS  9/6/2023     IR THORACENTESIS  8/29/2023     IR THORACENTESIS  8/18/2023     IR THORACENTESIS  8/7/2023     IR THORACENTESIS  7/27/2023     IR THORACENTESIS  7/17/2023     IR THORACENTESIS  1/12/2023     IR THORACENTESIS  12/29/2022     IR THORACENTESIS  12/23/2022     IR THORACENTESIS  12/16/2022     IR THORACENTESIS  10/18/2022     IR THORACENTESIS  10/10/2022     IR THORACENTESIS  10/3/2022     IR THORACENTESIS  9/27/2022     IR THORACENTESIS  9/14/2022     IR THORACENTESIS  9/1/2022     IR THORACENTESIS  8/29/2022     SEPTOPLASTY       TONSILLECTOMY          Allergies:  Allergies   Allergen Reactions     Levofloxacin Other (See Comments)     Tendonitis     Lisinopril      Allergist suggested lisinopril might be adding to his cough every, 2018.  Started losartan in its place but could try lisinopril again in the future as a trial if desired.     Simvastatin      PN: LW Reaction: myalgias at 80mg       Meds:  Current Outpatient Medications   Medication Sig Dispense Refill     acetaminophen (TYLENOL) 650 MG CR tablet Take 1,300 mg by mouth 2 times daily.       buPROPion (WELLBUTRIN XL) 150 MG 24 hr tablet TAKE 1 TABLET BY MOUTH EVERY DAY IN THE MORNING       diltiazem ER (DILT-XR) 120 MG 24 hr capsule TAKE 1 CAPSULE BY MOUTH EVERY DAY       famotidine (PEPCID) 20 MG tablet Take 20 mg by mouth 2 times daily       fish oil-omega-3 fatty acids 1000 MG capsule Take 1 g by mouth daily.       fish oil-omega-3 fatty acids 1000 MG capsule Take 2 g by mouth       gabapentin (NEURONTIN) 300 MG capsule Take 1,500 mg by mouth       hydrALAZINE (APRESOLINE) 10 MG tablet Take 0.5 tablets by mouth 2 times daily.       hydrochlorothiazide (HYDRODIURIL) 25 MG tablet Take 0.5 tablets by mouth daily       ibuprofen (ADVIL/MOTRIN) 800 MG tablet Take 1 tablet (800 mg) by mouth every 8 hours as needed for moderate pain (4-6) 50 tablet 0     lamoTRIgine (LAMICTAL) 100 MG tablet TAKE 2 TABLETS BY MOUTH IN THE MORNING AND TAKE 1 TABLET BY MOUTH AT BEDTIME       levothyroxine (SYNTHROID/LEVOTHROID) 137 MCG tablet Take 1 tablet by mouth daily       losartan (COZAAR) 25 MG tablet Take 1 tablet by mouth daily       melatonin 5 MG tablet Take 5 mg by mouth       memantine (NAMENDA) 28 x 5 MG & 21 x 10 MG tablet Take 1 tablet by mouth 2 times daily.       Multiple Vitamin (MULTIVITAMIN ADULT PO)        omeprazole (PRILOSEC) 20 MG DR capsule Take 20 mg by mouth       Probiotic Product (ALIGN) CAPS Take 1 tablet by mouth daily       rosuvastatin (CRESTOR) 5 MG tablet Take 1 tablet by mouth daily        sildenafil (REVATIO) 20 MG tablet Take 1-5 tablets 1 hour before SA, take on an empty stomach.       sodium chloride (NEBUSAL) 3 % neb solution Take 3 mLs by nebulization 2 times daily.       study - aspirin, IDS# [id number], 81 mg tablet Take 81 mg by mouth daily.       TIADYLT  MG 24 hr ER beaded capsule Take 240 mg by mouth daily.       traZODone (DESYREL) 50 MG tablet Take 50 mg by mouth at bedtime.       triamterene-HCTZ (DYAZIDE) 37.5-25 MG capsule Take 1 capsule by mouth daily.       vilazodone (VIIBRYD) 40 MG TABS tablet Take 40 mg by mouth       vitamin D3 (CHOLECALCIFEROL) 125 MCG (5000 UT) tablet        albuterol (PROAIR HFA/PROVENTIL HFA/VENTOLIN HFA) 108 (90 Base) MCG/ACT inhaler Inhale 2 puffs into the lungs (Patient not taking: Reported on 3/4/2025)       alum hydroxide-mag carbonate (GENACTON)  MG/15ML SUSP suspension Take 15 mLs by mouth       Ascorbic Acid 500 MG CHEW Currently taking 100mg Daily.       busPIRone (BUSPAR) 10 MG tablet Take 10 mg by mouth       furosemide (LASIX) 20 MG tablet Take 20 mg by mouth daily       HYDROcodone-acetaminophen (NORCO) 5-325 MG tablet Take 1-2 tablets by mouth every 4 hours as needed for moderate to severe pain (Patient not taking: Reported on 3/4/2025) 30 tablet 0     hydrOXYzine (ATARAX) 25 MG tablet Take 1 tablet (25 mg) by mouth 3 times daily as needed for itching (Patient not taking: Reported on 3/4/2025) 20 tablet 0     Lactobacillus (PROBIOTIC ACIDOPHILUS PO)        Multiple vitamin TABS Take 1 tablet by mouth daily.       Prucalopride Succinate (MOTEGRITY) 2 MG TABS Take 1 tablet by mouth daily       senna (SENOKOT) 8.6 MG tablet Take 1 tablet by mouth daily       tadalafil (CIALIS) 5 MG tablet Take 5 mg by mouth every 24 hours       terbutaline (BRETHINE) 5 MG tablet At 3 hrs of erection , take 1 tab. At 3 1/2 hrs, take an additional tab. At 4 hrs go to ER.       thiamine (B-1) 100 MG tablet Take 100 mg by mouth       No current  "facility-administered medications for this visit.       Fam Hx:  No family history on file.    P/S Hx:  Social History     Tobacco Use     Smoking status: Former     Types: Cigarettes     Passive exposure: Past     Smokeless tobacco: Never     Tobacco comments:     \"Quit smoking when I was 29\"   Substance Use Topics     Alcohol use: Not Currently         Physical Exam:  Very pleasant, healthy appearing, alert, oriented x 3, cooperative.  Normal mood and affect.  Not in cardiorespiratory distress.  Ht 1.848 m (6' 0.75\")   Wt 85.3 kg (188 lb)   BMI 24.97 kg/m    Normal upright posture.    Normal gait without assistive device.  No antalgia / imbalance.    No gross spinal deformity, no skin lesions or surgical scars.  Localizes pain at Posterior neck and trapezial area mostly on his Right side.   Tenderness: (-) midline, (-) paraspinal, (-) R and L PSIS.  ROM:   Elicits pain.     Neuro Exam:  Motor: 5/5  Sensory:  Normal.         Imaging:        Cervical AP LAT Xrays+ FLEX EX  -Patient with High T1 slope with lordosis of his cervical spine.   Multilevel diffuse degenerative disease with preserved lordotic alignment.     T1 slope 46  C2 slope 2  C2-T1 lord 44  C2-C7 SVA +1`.7cm    Cervical MRI:   - Multilevel Diffuse diease more notable to the right side of his cervical spine on his C3-4 and C4-5 levels.     Assessment:    Cervical pain and radiculopathy  Pseudomonas colonization of lungs and sinuses.   Right phrenic nerve palsy.    Plan:    Patient with above history and physical examination findings. Patient Has a long history of neck pain that has been treated with injections, physical therapy with limited relief. Patients imaging and physical examination findings make it hard to pinpoint which level, Or levels are mostly driving his currently symptoms. Of note patient has High T1 slope that could be driving force of his Cervical hyperlordosis and facet joint overload. This was explained to him but agree that at " this point, performing a large deformity correcting surgery when there is already presence of arthritis is not likely to achieve symptomatic relief.     - Right C4 SNRB   - Cervical CT scan to better be able to visualize pathology in the cervical spine.     Follow up with Virtual visit after injection and CT scan acquisition to discuss imaging findings and Injection results.     Justin Tripp MD  Spine Fellow    Attestation:  I (Dr. Tre Lucas - Spine Surgeon) have personally evaluated patient with Spine Fellow Dr. Justin Tripp, and agree with findings and plan outlined in the note, which I also edited.  I discussed at length with the patient/family, explained the nature of spinal condition, and formulated workup and/or treatment plan together.  All questions were answered to the best of my ability and to patient's apparent satisfaction.     >45 minutes spent on the date of the encounter doing chart review/review of outside records/review of test results/interpretation of tests/patient visit/documentation/discussion with other provider(s)/discussion with patient and family.    Tre Lucas MD    Orthopaedic Spine Surgery  Dept Orthopaedic Surgery, [facility]  [phone] office, [phone] pager  [email or web address]      Again, thank you for allowing me to participate in the care of your patient.        Sincerely,        Tre Lucas MD    Electronically signed

## 2025-03-11 ENCOUNTER — ANCILLARY PROCEDURE (OUTPATIENT)
Dept: CT IMAGING | Facility: CLINIC | Age: 70
End: 2025-03-11
Attending: STUDENT IN AN ORGANIZED HEALTH CARE EDUCATION/TRAINING PROGRAM
Payer: COMMERCIAL

## 2025-03-11 DIAGNOSIS — M47.812 CERVICAL SPONDYLOSIS WITHOUT MYELOPATHY: ICD-10-CM

## 2025-03-11 PROCEDURE — 72125 CT NECK SPINE W/O DYE: CPT | Performed by: RADIOLOGY

## 2025-03-17 ENCOUNTER — TELEPHONE (OUTPATIENT)
Dept: ANESTHESIOLOGY | Facility: CLINIC | Age: 70
End: 2025-03-17
Payer: COMMERCIAL

## 2025-03-17 DIAGNOSIS — M54.12 CERVICAL RADICULITIS: Primary | ICD-10-CM

## 2025-03-17 RX ORDER — LIDOCAINE 40 MG/G
CREAM TOPICAL
OUTPATIENT
Start: 2025-03-17

## 2025-03-17 NOTE — TELEPHONE ENCOUNTER
Called patient to schedule procedure with Dr. Yoon    Date of Procedure: 3/27/25    Arrival time given: Yes: Arrival Time 11am       Procedure Location: [facility], [place]     Verified Location with Patient:  Yes  Address provided to the patient    Pre-op H&P Required:  No: Loca anesthesia        Post-Op/Follow Up Appt:  Not Indicated in Request      Informed patient they will need a  to drive them home:  Yes    Patients : Spouse     Patient is aware that pre-op RN from the procedure center will call 2-3 days prior to scheduled procedure to confirm arrival time and review any instructions:  Yes       Additional Comments: N/A        Glenny Trinidad MA on 3/17/2025 at 3:19 PM      P: [phone]*

## 2025-03-18 ENCOUNTER — MYC MEDICAL ADVICE (OUTPATIENT)
Dept: ANESTHESIOLOGY | Facility: CLINIC | Age: 70
End: 2025-03-18
Payer: COMMERCIAL

## 2025-03-18 NOTE — TELEPHONE ENCOUNTER
RN reviewed patient chart. Pre procedure instructions were sent to the patient.    Do Russo RNCC

## 2025-03-19 NOTE — TELEPHONE ENCOUNTER
RN spoke with patient in regard to his antibiotic. Patient reports he has been on a tobramycin cycle for the past 6 months due to being a pseudomonas colonizer. He reports starting another 30 day cycle yesterday. Writer informed an update would be sent to the provider to review and advise on approval to proceed. Patient understood and appreciated the call back.    RN spoke with Dr. Yoon who approved for patient to proceed. He advised for patient to continue tobramycin nebulizer. Writer spoke with patient to update on approval.    Do Russo RNCC

## 2025-03-27 ENCOUNTER — HOSPITAL ENCOUNTER (OUTPATIENT)
Facility: AMBULATORY SURGERY CENTER | Age: 70
Discharge: HOME | End: 2025-03-27
Payer: COMMERCIAL

## 2025-03-27 ENCOUNTER — ANCILLARY ORDERS (OUTPATIENT)
Dept: ANESTHESIOLOGY | Facility: CLINIC | Age: 70
End: 2025-03-27

## 2025-03-27 ENCOUNTER — ANCILLARY PROCEDURE (OUTPATIENT)
Dept: RADIOLOGY | Facility: AMBULATORY SURGERY CENTER | Age: 70
End: 2025-03-27
Payer: COMMERCIAL

## 2025-03-27 VITALS
SYSTOLIC BLOOD PRESSURE: 144 MMHG | TEMPERATURE: 97.4 F | OXYGEN SATURATION: 95 % | HEART RATE: 62 BPM | HEIGHT: 73 IN | RESPIRATION RATE: 16 BRPM | WEIGHT: 188 LBS | DIASTOLIC BLOOD PRESSURE: 82 MMHG | BODY MASS INDEX: 24.92 KG/M2

## 2025-03-27 DIAGNOSIS — M54.12 CERVICAL RADICULITIS: Primary | ICD-10-CM

## 2025-03-27 DIAGNOSIS — M54.12 CERVICAL RADICULITIS: ICD-10-CM

## 2025-03-27 RX ORDER — DEXAMETHASONE SODIUM PHOSPHATE 10 MG/ML
INJECTION, SOLUTION INTRA-ARTICULAR; INTRALESIONAL; INTRAMUSCULAR; INTRAVENOUS; SOFT TISSUE DAILY PRN
Status: DISCONTINUED | OUTPATIENT
Start: 2025-03-27 | End: 2025-03-27 | Stop reason: HOSPADM

## 2025-03-27 RX ORDER — LIDOCAINE 40 MG/G
CREAM TOPICAL
Status: ACTIVE | OUTPATIENT
Start: 2025-03-27

## 2025-03-27 RX ORDER — IOPAMIDOL 408 MG/ML
INJECTION, SOLUTION INTRATHECAL DAILY PRN
Status: DISCONTINUED | OUTPATIENT
Start: 2025-03-27 | End: 2025-03-27 | Stop reason: HOSPADM

## 2025-03-27 RX ORDER — LIDOCAINE HYDROCHLORIDE 10 MG/ML
INJECTION, SOLUTION EPIDURAL; INFILTRATION; INTRACAUDAL; PERINEURAL DAILY PRN
Status: DISCONTINUED | OUTPATIENT
Start: 2025-03-27 | End: 2025-03-27 | Stop reason: HOSPADM

## 2025-03-27 NOTE — DISCHARGE INSTRUCTIONS
"PAIN INJECTION HOME CARE INSTRUCTIONS  Activity  -Rest today  -Do not work today  -Resume normal activity tomorrow  -DO NOT shower for 24 hours  -DO NOT remove bandaid for 24 hours    Pain  -You may experience soreness at the injection site for one or two days  -You may use an ice pack for 20 minutes every 2 hours for the first 24 hours  -You may use a heating pad after the first 24 hours  -You may use Tylenol (acetaminophen) every 4 hours or other pain medicines as directed by your physician    You may experience numbness radiating into your legs or arms (depending on the procedure location). This numbness may last several hours. Until sensation returns to normal; please use caution in walking, climbing stairs, and stepping out of your vehicle, etc.    DID YOU RECEIVE SEDATION TODAY?  {YES / NO:464584::\"Yes\"}    Safety  Sedation medicine, if given, may remain active for many hours. It is important for the next 24 hours that you do not:  -Drive a car  -Operate machines or power tools  -Consume alcohol, including beer  -Sign any important papers or legal documents    Please contact us if you have:  -Severe pain  -Fever more than 101.5 degrees Fahrenheit  -Signs of infection at the injection site (redness, swelling, or drainage)    FOR PAIN CENTER PATIENTS: Dr. Yoon  If you have questions, please contact the Pain Clinic at [phone] Option #1 between the hours of 7:00 am and 3:00 pm Monday through Friday. After office hours you can contact the on call provider by dialing [phone]. If you need immediate attention, we recommend that you go to a hospital emergency room or dial [phone].        "

## 2025-03-27 NOTE — PROCEDURES
Selective Nerve Root Block    The patient s identity, the procedure to be performed and the specific site of the procedure was verified in accordance with [facility] Protocol.     Diagnosis: Cervical Spondylosis without myelopathy  Pre-procedure Pain Score: 5/10     Procedure Note:    Informed consent was obtained.  The patient was positioned comfortably in the Supine position and was prepped and draped in a sterile fashion.  There was no evidence of infection at the site of needle insertion.  The skin was anesthetized with 1% lidocaine and a spinal needle was then placed in the proximity of the neuroforamen, staying just lateral to it, under fluoroscopic guidance.  CSF was not aspirated, blood was not aspirated.  Placement was then confirmed at each level in two planes of reference with radio-opaque contrast.  The patient tolerated the procedure without complications and was observed for 30 minutes post-injection.      The patient was given discharge instructions and verbalizes understanding, including understanding of those signs and symptoms that would require emergency care.     Level(s):C3-C4 (C4 nerve root)  Laterality: right     Needle Type:  25 gauge      Medication:  10 mg dexamethasone  0.5 ml  1%  Lidocaine    Post Procedure Pain Score: 2/10    Counseling: Greater than 50% of this patient visit was spent in counseling the patient regarding the treatment of their pain, coordinating their overall treatment plan and assessing their progress.            Jermaine Edouard MD  Pain Fellow, [facility]

## 2025-04-04 NOTE — PROGRESS NOTES
In-Person Visit    Chief Complaint   Patient presents with    RECHECK     DISCUSS SURGERY   Cervical spondylosis without myelopathy MRI done 01/16/25 at [facility]/DS 02/11/25          Last Visit Date: 3/4/25  Previous Impression:  1.  Cervical pain and radiculopathy  2.  Pseudomonas colonization of lungs and sinuses.   3.  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  Previous Plan:  - Right C4 SNRB   - Cervical CT scan to better be able to visualize pathology in the cervical spine.   Follow up with Virtual visit after injection and CT scan acquisition to discuss imaging findings and Injection results.       S>  69 year old male, here to review CT and injection response, and discuss further options.    3/27/25 - Right C4 SNRB with steroid (Dr. Yoon).  Per report, pain improved from 5/10 to 2.  Per patient, definitely hit the right spot, ~75% relief.  Lasted 4-5 days.  Completely worn off by now (1.5 wks).    Accompanied by wife.  Per patient, continues to have significant right-sided neck pain.  He felt the injection definitely hit the right spot, and he felt great for 4 to 5 days.  Now back to baseline.  Would like to pursue further treatment if possible.  Pain does not radiate down the arm.    Regarding his pulmonary issues, he said he still uses oxygen supplementation at night, not during the day.  Also has history of diaphragmatic plication performed at [facility].  Also had previous pleurodesis.  Sees Pulmonology (Dr. Stewart Christianson, [facility]/On license of [facility]).    From previous clinic note:  Neck 100%, Arm 0%,  Right sided mostly  Worse: Motion.   Better: Rest     Previous treatment:   Has had Physical therapy in the past     Previous Injections:  4/10/24 - Cervical RFA (Dr. Dante Escalante, [facility]).  6/3/24 - C7-T1 IL OPAL (Dr. Escalante, [facility]).  11/11/24 - C7-T1 IL OPAL (Dr. Escalante, [facility]).      Oswestry (KIMBERLYN) Questionnaire        4/8/2025     8:51 AM   OSWESTRY  DISABILITY INDEX   Count 10    Sum 5    Oswestry Score (%) 10 %        Patient-reported            Neck Disability Index (NDI) Questionnaire        4/8/2025     8:57 AM   Neck Disability Index (NDI)   Neck Disability Index: Count 10   NDI: Total Score = SUM (points for all 10 findings) 13   Neck Disability in Percent = (Total Score) / 50 * 100 26 (%)      NDI 4/8/25 26%      Visual Analog Pain Scale  Back Pain Scale 0-10: 0  Right leg pain: 0  Left leg pain: 0  Neck Pain Scale 0-10: 5 (rt side neck pain)  Right arm pain: 0  Left arm pain: 0    PROMIS-10 Scores  Global Mental Health Score: (Patient-Rptd) (P) 12  Global Physical Health Score: (Patient-Rptd) (P) 14  PROMIS TOTAL - SUBSCORES: (Patient-Rptd) (P) 26    Physical Examination:    Alert, oriented x 3, cooperative.  Not in CP distress.  There were no vitals taken for this visit.  Ambulates independently.   Grossly neurologically intact.  Detailed exam not performed today; please see previous note.    Imaging:    Cervical CT from 3/11/2025 reviewed.  Shows advanced multilevel cervical spondylosis.  There is severe right C3-4 foraminal stenosis secondary to advanced right facet arthropathy with significant osteophyte protruding into the foramen.    Assessment:    69/m RHD with:  1.  Cervical pain and radiculopathy  2.  Pseudomonas colonization of lungs and sinuses.   3.  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    Plan:    Had good long discussion with the patient and wife.  Given the imaging findings and injection response, I believe the right C3-4 foraminal stenosis secondary to advanced facet arthropathy is pinching the right C4 nerve root and mediating his pain.  We discussed the degenerative nature of this condition (nonlife threatening), and treatment options.  Essentially, his options are either continuing to live with symptoms, or pursuing surgical treatment.  I do not think repeat injections would be feasible, given that the steroid  injection only gave him 5 days worth of relief.  If he would have surgery, I believe this is best approached from posterior, as the nerve compression is coming from the facet joint, and not from the disc.  It would likely involve resection of the entire facet joint, which would then necessitate posterior instrumented fusion.  We discussed the rationale for the above surgery, risks, benefits and alternatives.  We discussed anticipated postsurgical course and restrictions.  We discussed bone graft options.  We will plan to use local autograft from the decompression, and if needed augment it with crushed cancellous allograft ([employer] graft [employer], [place]).    Given his medical conditions (Pseudomonas colonization of lungs and sinuses, phrenic nerve palsy, pleural effusions, s/p pleurodesis/diaphragmatic plication), would be better to keep surgery to a minimum, and not perform multilevel cervical fusion for spondylosis at other levels.  Will also need to be evaluated by the preanesthesia clinic.  If they deem necessary, will need clearance from pulmonology.    - Case request: Posterior instrumented spinal fusion cervical 3-4 with right foraminotomy/facetectomy; use of allograft.  - PAC referral.  - Flexeril 10 mg PO TID prn for spasms, #60, e-prescribed.    The patient does not have any untreated, underlying mental health conditions or issues which are a major contributor to their chronic pain.  Nonsmoker.    Has road trip coming up in end of April/early May.  Would prefer to have surgery mid-May or later.    40 minutes spent on the date of the encounter doing chart review/review of outside records/review of test results/interpretation of tests/patient visit/documentation/discussion with other provider(s)/discussion with patient and family.    Tre Lucas MD    Orthopaedic Spine Surgery  Dept Orthopaedic Surgery, [facility]  [phone] office, [phone]  pager  [email or web address]

## 2025-04-08 ENCOUNTER — OFFICE VISIT (OUTPATIENT)
Dept: ORTHOPEDICS | Facility: CLINIC | Age: 70
End: 2025-04-08
Payer: COMMERCIAL

## 2025-04-08 DIAGNOSIS — M54.12 CERVICAL RADICULOPATHY: ICD-10-CM

## 2025-04-08 DIAGNOSIS — M48.02 FORAMINAL STENOSIS OF CERVICAL REGION: Primary | ICD-10-CM

## 2025-04-08 DIAGNOSIS — M47.812 CERVICAL SPONDYLOSIS WITHOUT MYELOPATHY: ICD-10-CM

## 2025-04-08 PROCEDURE — 1125F AMNT PAIN NOTED PAIN PRSNT: CPT | Performed by: ORTHOPAEDIC SURGERY

## 2025-04-08 PROCEDURE — 99215 OFFICE O/P EST HI 40 MIN: CPT | Performed by: ORTHOPAEDIC SURGERY

## 2025-04-08 RX ORDER — CYCLOBENZAPRINE HCL 10 MG
10 TABLET ORAL 3 TIMES DAILY PRN
Qty: 60 TABLET | Refills: 1 | Status: SHIPPED | OUTPATIENT
Start: 2025-04-08

## 2025-04-08 NOTE — LETTER
4/8/2025      Alton Bronson  [id number] [facility]  [place] [address]      Dear Colleague,    Thank you for referring your patient, Alton Bronson, to the [facility] [place]. Please see a copy of my visit note below.    In-Person Visit    Chief Complaint   Patient presents with     RECHECK     DISCUSS SURGERY   Cervical spondylosis without myelopathy MRI done 01/16/25 at [facility]/ 02/11/25          Last Visit Date: 3/4/25  Previous Impression:  1.  Cervical pain and radiculopathy  2.  Pseudomonas colonization of lungs and sinuses.   3.  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  Previous Plan:  - Right C4 SNRB   - Cervical CT scan to better be able to visualize pathology in the cervical spine.   Follow up with Virtual visit after injection and CT scan acquisition to discuss imaging findings and Injection results.       S>  69 year old male, here to review CT and injection response, and discuss further options.    3/27/25 - Right C4 SNRB with steroid (Dr. Yoon).  Per report, pain improved from 5/10 to 2.  Per patient, definitely hit the right spot, ~75% relief.  Lasted 4-5 days.  Completely worn off by now (1.5 wks).    Accompanied by wife.  Per patient, continues to have significant right-sided neck pain.  He felt the injection definitely hit the right spot, and he felt great for 4 to 5 days.  Now back to baseline.  Would like to pursue further treatment if possible.  Pain does not radiate down the arm.    Regarding his pulmonary issues, he said he still uses oxygen supplementation at night, not during the day.  Also has history of diaphragmatic plication performed at [facility].  Also had previous pleurodesis.  Sees Pulmonology (Dr. Stewart Christianson, [facility]).    From previous clinic note:  Neck 100%, Arm 0%,  Right sided mostly  Worse: Motion.   Better: Rest     Previous treatment:   Has had Physical therapy in the past     Previous  Injections:  4/10/24 - Cervical RFA (Dr. Dante Escalante, [facility]).  6/3/24 - C7-T1 IL OPAL (Dr. Escalante, [facility]).  11/11/24 - C7-T1 IL OPAL (Dr. Escalante, [facility]).      Oswestry (KIMBERLYN) Questionnaire        4/8/2025     8:51 AM   OSWESTRY DISABILITY INDEX   Count 10    Sum 5    Oswestry Score (%) 10 %        Patient-reported            Neck Disability Index (NDI) Questionnaire        4/8/2025     8:57 AM   Neck Disability Index (NDI)   Neck Disability Index: Count 10   NDI: Total Score = SUM (points for all 10 findings) 13   Neck Disability in Percent = (Total Score) / 50 * 100 26 (%)      NDI 4/8/25 26%      Visual Analog Pain Scale  Back Pain Scale 0-10: 0  Right leg pain: 0  Left leg pain: 0  Neck Pain Scale 0-10: 5 (rt side neck pain)  Right arm pain: 0  Left arm pain: 0    PROMIS-10 Scores  Global Mental Health Score: (Patient-Rptd) (P) 12  Global Physical Health Score: (Patient-Rptd) (P) 14  PROMIS TOTAL - SUBSCORES: (Patient-Rptd) (P) 26    Physical Examination:    Alert, oriented x 3, cooperative.  Not in CP distress.  There were no vitals taken for this visit.  Ambulates independently.   Grossly neurologically intact.  Detailed exam not performed today; please see previous note.    Imaging:    Cervical CT from 3/11/2025 reviewed.  Shows advanced multilevel cervical spondylosis.  There is severe right C3-4 foraminal stenosis secondary to advanced right facet arthropathy with significant osteophyte protruding into the foramen.    Assessment:    69/m RHD with:  1.  Cervical pain and radiculopathy  2.  Pseudomonas colonization of lungs and sinuses.   3.  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    Plan:    Had good long discussion with the patient and wife.  Given the imaging findings and injection response, I believe the right C3-4 foraminal stenosis secondary to advanced facet arthropathy is pinching the right C4 nerve root and mediating his pain.  We discussed the degenerative  nature of this condition (nonlife threatening), and treatment options.  Essentially, his options are either continuing to live with symptoms, or pursuing surgical treatment.  I do not think repeat injections would be feasible, given that the steroid injection only gave him 5 days worth of relief.  If he would have surgery, I believe this is best approached from posterior, as the nerve compression is coming from the facet joint, and not from the disc.  It would likely involve resection of the entire facet joint, which would then necessitate posterior instrumented fusion.  We discussed the rationale for the above surgery, risks, benefits and alternatives.  We discussed anticipated postsurgical course and restrictions.  We discussed bone graft options.  We will plan to use local autograft from the decompression, and if needed augment it with crushed cancellous allograft (spinal graft technologies, [employer]).    Given his medical conditions (Pseudomonas colonization of lungs and sinuses, phrenic nerve palsy, pleural effusions, s/p pleurodesis/diaphragmatic plication), would be better to keep surgery to a minimum, and not perform multilevel cervical fusion for spondylosis at other levels.  Will also need to be evaluated by the preanesthesia clinic.  If they deem necessary, will need clearance from pulmonology.    - Case request: Posterior instrumented spinal fusion cervical 3-4 with right foraminotomy/facetectomy; use of allograft.  - PAC referral.  - Flexeril 10 mg PO TID prn for spasms, #60, e-prescribed.    The patient does not have any untreated, underlying mental health conditions or issues which are a major contributor to their chronic pain.  Nonsmoker.    Has road trip coming up in end of April/early May.  Would prefer to have surgery mid-May or later.    40 minutes spent on the date of the encounter doing chart review/review of outside records/review of test results/interpretation of tests/patient  visit/documentation/discussion with other provider(s)/discussion with patient and family.    Tre Lucas MD    Orthopaedic Spine Surgery  Dept Orthopaedic Surgery, [facility]  [phone] office, [phone] pager  [email or web address]      Again, thank you for allowing me to participate in the care of your patient.        Sincerely,        Tre Lucas MD    Electronically signed

## 2025-04-17 NOTE — TELEPHONE ENCOUNTER
FUTURE VISIT INFORMATION      SURGERY INFORMATION:  Date: 25  Location: [facility] OR  Surgeon:  Tre Lucas MD   Anesthesia Type:  General  Procedure: Posterior instrumented spinal fusion cervical 3-4 with right foraminotomy/facetectomy; use of allograft.   Consult: 25    RECORDS REQUESTED FROM:       Primary Care Provider: Alhaji López MD    Pertinent Medical History: Foraminal stenosis of cervical region; Cervical spondylosis without myelopathy; Cervical radiculopathy; Anxiety; Depression; Gastroesophageal reflux disease with esophagitis; Gastroparesis; Hypertension; Thyroid disease; Uncomplicated asthma; Pleural effusion;    Most recent EKG+ Tracin22    Most recent ECHO: 3/18/24    Most recent Cardiac Stress Test: 23    Most recent PFT's: 25    Most recent Sleep Study: 24

## 2025-06-03 ENCOUNTER — TELEPHONE (OUTPATIENT)
Dept: ORTHOPEDICS | Facility: CLINIC | Age: 70
End: 2025-06-03
Payer: COMMERCIAL

## 2025-06-03 ENCOUNTER — MYC MEDICAL ADVICE (OUTPATIENT)
Dept: ORTHOPEDICS | Facility: CLINIC | Age: 70
End: 2025-06-03
Payer: COMMERCIAL

## 2025-06-03 DIAGNOSIS — M54.12 CERVICAL RADICULOPATHY: ICD-10-CM

## 2025-06-03 DIAGNOSIS — M47.812 CERVICAL SPONDYLOSIS WITHOUT MYELOPATHY: ICD-10-CM

## 2025-06-03 DIAGNOSIS — M48.02 FORAMINAL STENOSIS OF CERVICAL REGION: Primary | ICD-10-CM

## 2025-06-03 RX ORDER — METHOCARBAMOL 500 MG/1
500 TABLET, FILM COATED ORAL 3 TIMES DAILY
Qty: 90 TABLET | Refills: 1 | Status: SHIPPED | OUTPATIENT
Start: 2025-06-03

## 2025-06-03 NOTE — TELEPHONE ENCOUNTER
Attempted and left voicemail regarding the possible change to his medications. Because I did not speak directly to the patient I did not leave my recommendation but asked him to call us back before I ordered him an alternative. Encouraged patient to call us back before changing medication.     If he calls you may share the following statement regarding :    The three most common muscle relaxer's in this spine practice are likely robaxin (methocarbamol), flexeril (cyclobenzaprine) and Zanafelx (tizanidine).   In practice, I have found robaxin to be the least sedating, but per Up to Date all the above muscle relaxers are still CNS depressants and could impact/ worsen your depression and buspirone. The resource Up to Date recommends monitoring you while on any of these muscle relaxers and your buspirone.     Would you like to try the Robaxin/ methocarbamol?

## 2025-06-03 NOTE — TELEPHONE ENCOUNTER
See phone message from pt replying to VM left for pt to call back by ADONAY Gustafson-see My Chart message from pt in separate encounter. & order from Sj can try to switch from Flexeril to Robaxin.    I called pt back who stated was only taking the Flexeril BID for tightness in neck muscles & it helped a lot.  Stated has leftover Robaxin 500mg so will stop Flexeril & start Robaxin at BID & wants our order to be TID so pt can increase if needed.  We discussed watching for sedation symptoms & pt stated wife is helping pt.  Advised F/U with primary provider & provider who prescribes Depression med & pt agreed.  Ordered Robaxin 500mg TID.    Surgery scheduled for 6-30-25.  Call back prn.   Pt agreed.  Fern Hernández NP//Slime Tse RN.

## 2025-06-03 NOTE — TELEPHONE ENCOUNTER
Other: Patient would like to try the methocarbamol prescription. Is wondering if that order will come from Dr. Lucas. Please call patient back.      Could we send this information to you in [employer] or would you prefer to receive a phone call?:   Patient would prefer a phone call   Okay to leave a detailed message?: Yes at Cell number on file:    Telephone Information:   Mobile [phone]

## 2025-06-11 LAB
ABO + RH BLD: NORMAL
BLD GP AB SCN SERPL QL: NEGATIVE
SPECIMEN EXP DATE BLD: NORMAL

## 2025-06-12 ENCOUNTER — LAB (OUTPATIENT)
Dept: LAB | Facility: CLINIC | Age: 70
End: 2025-06-12
Payer: COMMERCIAL

## 2025-06-12 ENCOUNTER — APPOINTMENT (OUTPATIENT)
Dept: LAB | Facility: CLINIC | Age: 70
End: 2025-06-12
Payer: COMMERCIAL

## 2025-06-12 ENCOUNTER — PRE VISIT (OUTPATIENT)
Dept: SURGERY | Facility: CLINIC | Age: 70
End: 2025-06-12

## 2025-06-12 ENCOUNTER — ANESTHESIA EVENT (OUTPATIENT)
Dept: SURGERY | Facility: CLINIC | Age: 70
End: 2025-06-12
Payer: COMMERCIAL

## 2025-06-12 ENCOUNTER — OFFICE VISIT (OUTPATIENT)
Dept: SURGERY | Facility: CLINIC | Age: 70
End: 2025-06-12
Payer: COMMERCIAL

## 2025-06-12 VITALS
WEIGHT: 182.4 LBS | HEIGHT: 73 IN | OXYGEN SATURATION: 96 % | BODY MASS INDEX: 24.18 KG/M2 | DIASTOLIC BLOOD PRESSURE: 92 MMHG | SYSTOLIC BLOOD PRESSURE: 144 MMHG | HEART RATE: 80 BPM | TEMPERATURE: 98.7 F | RESPIRATION RATE: 16 BRPM

## 2025-06-12 DIAGNOSIS — M54.12 CERVICAL RADICULOPATHY: ICD-10-CM

## 2025-06-12 DIAGNOSIS — Z01.818 PREOP EXAMINATION: Primary | ICD-10-CM

## 2025-06-12 DIAGNOSIS — M48.02 FORAMINAL STENOSIS OF CERVICAL REGION: ICD-10-CM

## 2025-06-12 DIAGNOSIS — M47.812 CERVICAL SPONDYLOSIS WITHOUT MYELOPATHY: ICD-10-CM

## 2025-06-12 DIAGNOSIS — Z01.818 PREOP EXAMINATION: ICD-10-CM

## 2025-06-12 LAB
ANION GAP SERPL CALCULATED.3IONS-SCNC: 10 MMOL/L (ref 7–15)
BUN SERPL-MCNC: 17 MG/DL (ref 8–23)
CALCIUM SERPL-MCNC: 9.7 MG/DL (ref 8.8–10.4)
CHLORIDE SERPL-SCNC: 102 MMOL/L (ref 98–107)
CREAT SERPL-MCNC: 0.9 MG/DL (ref 0.67–1.17)
EGFRCR SERPLBLD CKD-EPI 2021: >90 ML/MIN/1.73M2
ERYTHROCYTE [DISTWIDTH] IN BLOOD BY AUTOMATED COUNT: 14.2 % (ref 10–15)
GLUCOSE SERPL-MCNC: 97 MG/DL (ref 70–99)
HCO3 SERPL-SCNC: 27 MMOL/L (ref 22–29)
HCT VFR BLD AUTO: 40.4 % (ref 40–53)
HGB BLD-MCNC: 13.2 G/DL (ref 13.3–17.7)
MCH RBC QN AUTO: 30.3 PG (ref 26.5–33)
MCHC RBC AUTO-ENTMCNC: 32.7 G/DL (ref 31.5–36.5)
MCV RBC AUTO: 93 FL (ref 78–100)
PLATELET # BLD AUTO: 277 10E3/UL (ref 150–450)
POTASSIUM SERPL-SCNC: 4 MMOL/L (ref 3.4–5.3)
RBC # BLD AUTO: 4.35 10E6/UL (ref 4.4–5.9)
SODIUM SERPL-SCNC: 139 MMOL/L (ref 135–145)
WBC # BLD AUTO: 5.2 10E3/UL (ref 4–11)

## 2025-06-12 RX ORDER — TOBRAMYCIN INHALATION SOLUTION 300 MG/5ML
300 INHALANT RESPIRATORY (INHALATION)
COMMUNITY
Start: 2025-06-03

## 2025-06-12 RX ORDER — GENTAMICIN SULFATE 590MCG/MG
POWDER (GRAM) MISCELLANEOUS 2 TIMES DAILY
COMMUNITY
Start: 2025-06-11

## 2025-06-12 ASSESSMENT — LIFESTYLE VARIABLES: TOBACCO_USE: 1

## 2025-06-12 ASSESSMENT — PAIN SCALES - GENERAL: PAINLEVEL_OUTOF10: MILD PAIN (3)

## 2025-06-12 ASSESSMENT — ENCOUNTER SYMPTOMS: SEIZURES: 0

## 2025-06-12 NOTE — OUTPATIENT NURSE NOTE
06/12/25 1150   Discharge Planning   Patient/Family Anticipates Transition to home   Concerns to be Addressed all concerns addressed in this encounter   Living Arrangements   People in Home spouse   Type of Residence Private Residence   Is your private residence a single family home or apartment? Single family home   Number of Stairs, Within Home, Primary one   Stair Railings, Within Home, Primary railings safe and in good condition   Once home, are you able to live on one level? Yes   Which level? Main Level   Bathroom Shower/Tub Walk-in shower   Equipment Currently Used at Home none   Support System   Support Systems Spouse/Significant Other   Do you have someone available to stay with you one or two nights once you are home? Yes   Relationship/Environment   Name(s) of People in Home Lisa

## 2025-06-12 NOTE — H&P
Pre-Operative H & P     CC:  Preoperative exam to assess for increased cardiopulmonary risk while undergoing surgery and anesthesia.    Date of Encounter: 6/12/2025  Primary Care Physician:  Alhaji López     Reason for visit:   Encounter Diagnoses   Name Primary?    Foraminal stenosis of cervical region Yes    Cervical spondylosis without myelopathy     Cervical radiculopathy     Preop examination        HPI  Alton Bronson is a 70 year old male who presents for pre-operative H & P in preparation for  Procedure Information       Case: [id number] Date/Time: 06/30/25 0730    Procedure: Posterior instrumented spinal fusion cervical 3-4 with right foraminotomy/facetectomy; use of allograft. 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    Mr. Bronson has a history of significant right-sided neck pain with radiculopathy. He has been found to have right C3-4 foraminal stenosis secondary to advanced facet arthropathy. He has been counseled by Dr. Lucas and now presents for the above procedure.      History is obtained from the patient and chart review    Hx of abnormal bleeding or  anti-platelet use: denies      Past Medical History  Past Medical History:   Diagnosis Date    ADD (attention deficit disorder)     Anxiety     Cervical radiculopathy     Depression     Foraminal stenosis of cervical region     Gastroesophageal reflux disease with esophagitis     Gastroparesis     Hypertension     Osteoarthritis     Pleural effusion     Thyroid disease     Uncomplicated asthma        Past Surgical History  Past Surgical History:   Procedure Laterality Date    ARTHRODESIS TOE(S) Right 11/28/2022    Procedure: RIGHT FIRST METATARSOPHALANGEAL JOINT ARTHRODESIS,;  Surgeon: Alhaji Louise MD;  Location: [facility]    BUNIONECTOMY Right 11/28/2022    Procedure: RIGHT BUNIONETTE REPAIR;  Surgeon: Alhaji Louise MD;  Location: [facility]    GASTRIC FUNDOPLICATION      HERNIA REPAIR      INJECT EPIDURAL TRANSFORAMINAL Right 3/27/2025    Procedure: C4 Selective Nerve Root Block;  Surgeon: Jacek Yoon MD;  Location: [facility] OR    IR PICC PLACEMENT > 5 YRS OF AGE  3/20/2024    IR PICC PLACEMENT > 5 YRS OF AGE  3/22/2024    IR PICC PLACEMENT > 5 YRS OF AGE  3/25/2024    IR THORACENTESIS  10/10/2023    IR THORACENTESIS  9/26/2023    IR THORACENTESIS  9/18/2023    IR THORACENTESIS  9/6/2023    IR THORACENTESIS  8/29/2023    IR THORACENTESIS  8/18/2023    IR THORACENTESIS  8/7/2023    IR THORACENTESIS  7/27/2023    IR THORACENTESIS  7/17/2023    IR THORACENTESIS  1/12/2023    IR THORACENTESIS  12/29/2022    IR THORACENTESIS  12/23/2022    IR THORACENTESIS  12/16/2022    IR THORACENTESIS  10/18/2022    IR THORACENTESIS  10/10/2022    IR THORACENTESIS  10/3/2022    IR THORACENTESIS  9/27/2022    IR THORACENTESIS  9/14/2022    IR THORACENTESIS  9/1/2022    IR THORACENTESIS  8/29/2022    SEPTOPLASTY      TONSILLECTOMY         Prior to Admission Medications  Current Outpatient Medications   Medication Sig Dispense Refill    acetaminophen (TYLENOL) 650 MG CR tablet Take 1,300 mg by mouth 2 times daily.      albuterol  (PROAIR HFA/PROVENTIL HFA/VENTOLIN HFA) 108 (90 Base) MCG/ACT inhaler Inhale 2 puffs into the lungs every 4 hours as needed.      buPROPion (WELLBUTRIN XL) 150 MG 24 hr tablet Take 150 mg by mouth every morning.      busPIRone (BUSPAR) 10 MG tablet Take 15 mg by mouth 2 times daily.      famotidine (PEPCID) 20 MG tablet Take 20 mg by mouth 2 times daily      fish oil-omega-3 fatty acids 1000 MG capsule Take 2 g by mouth daily.      gabapentin (NEURONTIN) 300 MG capsule Take 1,500 mg by mouth at bedtime.      gentamicin sulfate POWD Spray in nostril 2 times daily.      hydrALAZINE (APRESOLINE) 10 MG tablet Take 0.5 tablets by mouth 2 times daily.      ibuprofen (ADVIL/MOTRIN) 800 MG tablet Take 1 tablet (800 mg) by mouth every 8 hours as needed for moderate pain (4-6) (Patient taking differently: Take 400 mg by mouth 2 times daily.) 50 tablet 0    lamoTRIgine (LAMICTAL) 100 MG tablet Take 150 mg by mouth at bedtime.      levothyroxine (SYNTHROID/LEVOTHROID) 137 MCG tablet Take 1 tablet by mouth every morning.      losartan (COZAAR) 25 MG tablet Take 1 tablet by mouth every morning.      melatonin 5 MG tablet Take 5 mg by mouth at bedtime.      memantine (NAMENDA) 28 x 5 MG & 21 x 10 MG tablet Take 1 tablet by mouth 2 times daily.      methocarbamol (ROBAXIN) 500 MG tablet Take 1 tablet (500 mg) by mouth 3 times daily. 90 tablet 1    Multiple Vitamin (MULTIVITAMIN ADULT PO) Take by mouth every morning.      omeprazole (PRILOSEC) 20 MG DR capsule Take 20 mg by mouth 2 times daily.      Probiotic Product (ALIGN) CAPS Take 1 tablet by mouth every morning.      rosuvastatin (CRESTOR) 5 MG tablet Take 1 tablet by mouth every morning.      sildenafil (REVATIO) 20 MG tablet Take 1-5 tablets 1 hour before SA, take on an empty stomach.      sodium chloride (NEBUSAL) 3 % neb solution Take 3 mLs by nebulization 2 times daily.      study - aspirin, IDS# [id number], 81 mg tablet Take 81 mg by mouth every morning.      TIADYLT  MG  "24 hr ER beaded capsule Take 240 mg by mouth daily.      tobramycin, PF, (AMERICA) 300 MG/5ML neb solution Inhale 300 mg into the lungs two times daily.      traZODone (DESYREL) 50 MG tablet Take 50 mg by mouth at bedtime.      triamterene-HCTZ (DYAZIDE) 37.5-25 MG capsule Take 1 capsule by mouth every morning.      vilazodone (VIIBRYD) 40 MG TABS tablet Take 40 mg by mouth every morning.      vitamin D3 (CHOLECALCIFEROL) 125 MCG (5000 UT) tablet every morning.         Allergies  Allergies   Allergen Reactions    Levofloxacin Other (See Comments)     Tendonitis    Lisinopril      Allergist suggested lisinopril might be adding to his cough every, 2018.  Started losartan in its place but could try lisinopril again in the future as a trial if desired.    Simvastatin      PN: LW Reaction: myalgias at 80mg       Social History  Social History     Socioeconomic History    Marital status:      Spouse name: Not on file    Number of children: Not on file    Years of education: Not on file    Highest education level: Not on file   Occupational History    Not on file   Tobacco Use    Smoking status: Former     Types: Cigarettes     Passive exposure: Past    Smokeless tobacco: Never    Tobacco comments:     \"Quit smoking when I was 29\"   Vaping Use    Vaping status: Never Used   Substance and Sexual Activity    Alcohol use: Not Currently    Drug use: Never    Sexual activity: Yes     Partners: Female   Other Topics Concern    Not on file   Social History Narrative    Not on file     Social Drivers of Health     Financial Resource Strain: Low Risk  (1/16/2023)    Received from [facility]    Overall Financial Resource Strain (CARDIA)     Difficulty of Paying Living Expenses: Not hard at all   Food Insecurity: No Food Insecurity (7/23/2024)    Received from [facility]    Hunger Vital Sign     Worried About Running Out of Food in the Last Year: Never true     Ran Out of Food in the Last Year: Never true   Transportation " Needs: No Transportation Needs (7/23/2024)    Received from [facility]    PRAPARE - Transportation     Lack of Transportation (Medical): No     Lack of Transportation (Non-Medical): No   Physical Activity: Insufficiently Active (5/1/2024)    Received from [facility]    Exercise Vital Sign     Days of Exercise per Week: 3 days     Minutes of Exercise per Session: 20 min   Stress: No Stress Concern Present (1/16/2023)    Received from [facility]    [employer] [place] [employer] - Occupational Stress Questionnaire     Feeling of Stress : Only a little   Social Connections: Unknown (1/16/2023)    Received from [facility]    Social Connection and Isolation Panel [NHANES]     Frequency of Communication with Friends and Family: More than three times a week     Frequency of Social Gatherings with Friends and Family: More than three times a week     Attends Spiritism Services: Patient declined     Active Member of Clubs or Organizations: Patient declined     Attends Club or Organization Meetings: More than 4 times per year     Marital Status:    Interpersonal Safety: Low Risk  (3/27/2025)    Interpersonal Safety     Do you feel physically and emotionally safe where you currently live?: Yes     Within the past 12 months, have you been hit, slapped, kicked or otherwise physically hurt by someone?: No     Within the past 12 months, have you been humiliated or emotionally abused in other ways by your partner or ex-partner?: No   Housing Stability: Low Risk  (7/23/2024)    Received from [facility]    Housing Stability     What is your living situation today?: I have a steady place to live       Family History  Family History   Problem Relation Age of Onset    Cerebrovascular Disease Mother 65    Anesthesia Reaction No family hx of     Deep Vein Thrombosis (DVT) No family hx of        Review of Systems  The complete review of systems is negative other than noted in the HPI or here.     Anesthesia  Evaluation   Pt has had prior anesthetic.     No history of anesthetic complications       ROS/MED HX  ENT/Pulmonary: Comment: Pseudomonas colonization of lungs and sinuses. Yellow nail syndrome.    Recurrent pleural effusions, s/p left pleurodesis. Right phrenic nerve palsy, s/p right diaphragmatic plication.    Chronic productive cough; improved w/ tobramycin nebs.    Nocturnal oxygen supplementation    (+)                tobacco use, Past use,                    (-) sleep apnea and recent URI   Neurologic:  - neg neurologic ROS   (+)    no peripheral neuropathy                         (-) no seizures and migraines   Cardiovascular: Comment: Mild/mod coronary artery calcifications    (+) Dyslipidemia hypertension-range: 125/80/ -   -  - -                                 Previous cardiac testing   Echo: Date: Results:    Stress Test:  Date: 2023 Results:  1. Dobutamine stress echocardiogram negative for myocardial ischemia.   2. Ejection fraction response from 60% at rest to 75% at peak stress, left ventricular end-systolic volume decreased with stress.   3. A peak heart rate of 130 BPM was achieved (85% age-predicted maximal HR).   4. The stress ECG was negative for ischemia.   5. REST IMAGES:   6. Normal right ventricular chamber size with normal systolic function.   7. Normal sized atria. Current Doppler parameters lean towards normal left-sided filling pressures at rest.   8. Trileaflet aortic valve. Trivial to mild aortic regurgitation.   9. Normal inferior vena cava size with normal inspiratory collapse (>50%).   10. Normal mid ascending aorta diameter of 40 mm.     ECG Reviewed:  Date: Results:    Cath:  Date: Results:      METS/Exercise Tolerance: 4 - Raking leaves, gardening Comment: Does yard work, walks dog. Somewhat limited by neck pain. Denies CP. Chronic longstanding SOB with exertion. Denies SOB with walking.   Hematologic: Comments: Lymphopenia     (-) history of blood clots   Musculoskeletal:   (+)  " arthritis,             GI/Hepatic: Comment: Gastroparesis, Esophageal dysmotility, hiatal hernia - he  elevates head of his bed and uses PPI and H2 blockers both BID    (+) GERD, Asymptomatic on medication,               (-) liver disease   Renal/Genitourinary:  - neg Renal ROS  (-) renal disease   Endo:     (+)          thyroid problem, hypothyroidism,        (-) Type II DM   Psychiatric/Substance Use: Comment: Hx ketamine infusions  Followed at [facility]    ADD      (+) psychiatric history anxiety and depression       Infectious Disease:  - neg infectious disease ROS     Malignancy:  - neg malignancy ROS     Other:  - neg other ROS          BP (!) 144/92 (BP Location: Right arm, Patient Position: Sitting, Cuff Size: Adult Regular)   Pulse 80   Temp 98.7  F (37.1  C) (Oral)   Resp 16   Ht 1.848 m (6' 0.75\")   Wt 82.7 kg (182 lb 6.4 oz)   SpO2 96%   BMI 24.23 kg/m      Physical Exam  Constitutional: Awake, alert, cooperative, no apparent distress, and appears stated age.  Eyes: Pupils equal, round and reactive to light, extra ocular muscles intact, sclera clear, conjunctiva normal.  HENT: Normocephalic, oral pharynx with moist mucus membranes, good dentition. No goiter appreciated. No removable dental hardware.  Respiratory: Clear to auscultation bilaterally, no crackles or wheezing. No SOB when supine.  Cardiovascular: Regular rate and rhythm, normal S1 and S2, and no murmur noted.  Carotids +2, no bruits. No edema. Palpable pulses to radial, DP and PT arteries.   GI: Normal bowel sounds, soft, non-distended, non-tender, no masses palpated.    Lymph/Hematologic: No cervical lymphadenopathy and no supraclavicular lymphadenopathy.  Genitourinary:  deferred  Skin: Warm and dry.  No rashes.   Musculoskeletal: full ROM of neck. There is no redness, warmth, or swelling of the joints. Gross motor strength is normal.    Neurologic: Awake, alert, oriented to name, place and time. Cranial nerves II-XII are grossly " intact. Gait is normal. Ambulates from chair to exam table, seats self, lies supine and sits back up w/o assistance.  Neuropsychiatric: Calm, cooperative. Normal affect. Pleasant. Answers questions appropriately, follows commands w/o difficulty.        PRIOR LABS/DIAGNOSTIC STUDIES:    All pertinent records, results, labs and imaging personally reviewed      CT ANGIO CHEST W IV CONT PE STUDY: 11/12/2023   1.  No pulmonary embolus.   2.  Right basilar chest tube. Small right pleural effusion. No significant pneumothorax.   3.  Small left pleural effusion with loculated components extending along the major fissure and at the base. Multiple areas of pleural thickening and enhancement on the left, which could be related to prior pleurodesis procedure.   4.  Dense material along the hemidiaphragm on the right which may be related to prior diaphragmatic repair. This can be correlated with clinical history. There is a moderate to large sized fat-containing diaphragmatic hernia on the right which appears new as compared to the prior CT from 12/15/2022 and which could account for the patient's symptoms.   CORONARY ARTERY CALCIFICATION: Mild to moderate.       Stress test -  please see in ROS above        Outside records reviewed from: Care Everywhere    LAB/DIAGNOSTIC STUDIES TODAY:  BMP, CBC, T&S    Assessment    Alton Bronson is a 70 year old male seen as a PAC referral for risk assessment and optimization for anesthesia.    Plan/Recommendations  Pt will be optimized for the proposed procedure.  See below for details on the assessment, risk, and preoperative recommendations    NEUROLOGY  - No history of TIA, CVA or seizure    -Post Op delirium risk factors:  No risk identified    ENT  - No current airway concerns.  Will need to be reassessed day of surgery.  Mallampati: I  TM: > 3  - Chronic sinusitis    CARDIAC  - HTN, hold losartan & triamterene-hydrochlorothiazide DOS  - Mild/mod coronary artery calcifications    -  "METS (Metabolic Equivalents)  Does yard work, walks dog. Somewhat limited by neck pain. Denies CP. Chronic longstanding SOB with exertion. Denies SOB with walking.    Patient performs 4 or more METS exercise without symptoms             Total Score: 0      RCRI-Very low risk: Class 1 0.4% complication rate             Total Score: -    Criteria with incomplete data:    RCRI: High Risk Surgery    RCRI: CAD    RCRI: CHF    RCRI: Cerebrovascular Disease     RCRI: Diabetes    RCRI: Elevated Creatinine      - This score is not calculated because of inadequate data       PULMONARY  - Followed by pulmonolgy @ [facility]; last seen 3/3/25.  - Yellow nail syndrome, Chronic cough, h/o B/L pleural effusions, Pseudomonas colonization, s/p VATS pleurodesis on the Left, s/p diaphragmatic plication on Right Nov 2023.   - Uses 2L O2 at night.    - Per Dr. Christianson's note dated 3/3/25, \"assuming he continues to improve with his nebulized tobramycin, he is in acceptable condition to proceed with his anticipated C3-C4 fusion surgery. If he still has significant coughing or dyspnea, would recommend delaying if he will have any significant mobility restrictions after surgery or we will have any postoperative period in which he we will be unable to be upright.  Assuming his symptoms are baseline however this is a chronic and likely lifelong colonization state for Mr. Bronson and thus presence of pseudomonas is not a contraindication to surgery. \"    - Per note dated 6/3/25 in Care Everywhere, the pts persistent productive cough has improved to resolution with nebulized tobramycin. He uses 3% saline nebulized daily when he is not using the john.       URIAH Low Risk             Total Score: -    Criteria with incomplete data:    URIAH: Snores loudly    URIAH: Often tired    URIAH: Observed stopped breathing    URIAH: Hypertension    URIAH: BMI over 35 kg/m2    URIAH: Over 50 ys old    URIAH: Neck Circum >16 in    URIAH: Male      - This score is not " "calculated because of inadequate data       - Tobacco History    History   Smoking Status    Former    Types: Cigarettes   Smokeless Tobacco    Never       GI  - GERD, Controlled on medications:    - Gastroparesis, Esophageal dysmotility, hiatal hernia.  Pt  elevates head of his bed and uses PPI and H2 blockers both BID    PONV Medium Risk  Total Score: 2           1 AN PONV: Patient is not a current smoker    1 AN PONV: Intended Post Op Opioids        /RENAL  - Baseline Creatinine  0.8    ENDOCRINE    - BMI: Estimated body mass index is 24.23 kg/m  as calculated from the following:    Height as of this encounter: 1.848 m (6' 0.75\").    Weight as of this encounter: 82.7 kg (182 lb 6.4 oz).  Healthy Weight (BMI 18.5-24.9)  - No history of Diabetes Mellitus  - Hypothyroidism    HEME  VTE Low Risk 0.26%             Total Score: 0      - No history of abnormal bleeding or antiplatelet use.        MSK  Patient is NOT Frail             Total Score: 0          PSYCH  - Depression, h/o ketamine infusions. Stable on memantine.  - Anxiety      ONCOLOGY/IMMUNOLOGY  - Persistent lymphopenia; followed by asthma/allergic disease provider ( see note 2/13/25) and ID (see note 11/27/24). Patient is on Lamictal, which has been known to be associated with blood abnormalities including lymphopenia. Negative sweat test. Normal IgG, IgM, and IgA, IgE. Low CD4 and CD8.       The patient is aware that the final anesthesia plan will be decided by the assigned anesthesia provider on the date of service.      The patient is optimized for their procedure. AVS with information on surgery time/arrival time, meds and NPO status given by nursing staff. No further diagnostic testing indicated.        55 minutes were spent on the date of the encounter performing chart review, history and exam, documentation and/or discussion with other providers about the issues documented above.    Remedios Smith PA-C  Preoperative Assessment [facility] " of [facility]  [facility]  Phone: [phone]  Fax: [phone]

## 2025-06-12 NOTE — PATIENT INSTRUCTIONS
Preparing for Your Surgery      Name:  Alton Bronson   MRN:  [id number]   :  1955   Today's Date:  2025     The [place] [employer] [address]-[employer] Construction Project                                Timeline 2025 -2025    This project will affect travel to the [facility] and [facility], as well as the [facility].      Please check the [facility] [address] project website for the most up to date information and give yourself additional time to reach your destination.        Arriving for surgery:  Surgery date:  2025  Arrival time:  5:30 am    Please come to:       [facility] [address]   [address] [place]  [address]     The Green Ramp for patients and visitors is beneath the [facility]. The parking facility entrance is at the intersection of [address] [place] [address] and [address]. Patients and visitors who self-park will receive the reduced hospital parking rate (no ticket validation needed).     [employer] parking, located at the [facility] main entrance on [address] [place] [address], is available Monday - Friday from 7 am to 3:30 pm.     Discounted parking pass options can be purchased from  attendants during business hours.     -Check in at the security desk in the [facility] ([facility])   Lobby. They will direct you to the correct elevators.   -Proceed to the 3rd floor, Adult Surgery Waiting Lounge. [phone]     If you need directions, a wheelchair or escort please stop at the Information Desk in the lobby.  Inform the information person you are here for surgery; a wheelchair and escort to [address] will be provided.   An escort to the Adult Surgery Waiting Lounge will be provided. .    What can I eat or drink?  -  You may eat and drink normally up to 8 hours prior to arrival time. (Until 9:30 pm on  6/29/2025)  -  You may have clear liquids until 2 hours prior to arrival time. (Until 3:30 am)    Examples of clear liquids:  Water  Clear broth  Juices (apple, white grape, white cranberry  and cider) without pulp  Noncarbonated, powder based beverages  (lemonade and Asael-Aid)  Sodas (Sprite, 7-Up, ginger ale and seltzer)  Coffee or tea (without milk or cream)  Gatorade    -  No Alcohol or cannabis products for at least 24 hours before surgery.     Which medicines can I take?    **Hold Aspirin for 7 days before surgery. Last dose is on 6/22/2025.  **Hold Multivitamins for 7 days before surgery. Last dose is on 6/22/2025.  *Hold Supplements for 7 days before surgery, including Fish Oil-Omega3 Lolita Acids and Gentamicin Sulfate powder. Last dose is on 6/22/2025.   **Hold Ibuprofen (Advil, Motrin) for 3 day(s) before surgery--unless otherwise directed by surgeon.  Hold Naproxen (Aleve) for 4 days before surgery.    Hold Sildenafil (Revatio), 24 hours prior to surgery.     Take evening/bedtime medications as prescribed the evening before surgery.      -  DO NOT take these medications the day of surgery:      Hydroxyzine (Atarax)  Losartan (Cozaar)   Algin Probiotic  Prucalopride Succinate (Motegrity)   Cholecalciferol (Vitamin D3)   Triamterene-hydrochlorothiazide (Dyazide)       -  PLEASE TAKE these medications the day of surgery:    Acetaminophen (Tylenol)   Use inhalers as needed prior to surgery. You may bring your rescue inhaler on the day of surgery.   Bupropion (Wellbutrin)   Buspirone (Buspar)   Famotidine (Pepcid)   Gabapentin (Neurontin)   Hydralazine (Apresoline)   Lamotrigine (Lamictal)   Levothyroxine (Synthroid)   Memantine (Namenda)  Methocarbamol (Robaxin)   Omeprazole (Prilosec)  Rosuvastain (Crestor)    Tiadylt   Vilazodone (Viibryd)           How do I prepare myself?  - Please take 2 showers (one the night prior to surgery and one the morning of surgery) using Scrubcare or Hibiclens soap.    Use this  soap only from the neck to your toes. Avoid genital area      Leave the soap on your skin for one minute--then rinse thoroughly.      You may use your own shampoo and conditioner. No other hair products.   - Please remove all jewelry and body piercings.  - No lotions, deodorants or fragrance.  - No makeup or fingernail polish.   - Bring your ID and insurance card.    -For patients being admitted to the [facility]  Family members are to take the patient belongings with them and place them in the lockers provided in the Family Lounge.  Please limit the items you bring to 1 bag as the lockers are small.      -If you use a CPAP machine, please bring the CPAP machine, tubing, and mask to hospital.    -If you have a Deep Brain Stimulator, Spinal Cord Stimulator, or any Neuro Stimulator device---you must bring the remote control to the hospital.      ALL PATIENTS GOING HOME THE SAME DAY OF SURGERY ARE REQUIRED TO HAVE A RESPONSIBLE ADULT TO DRIVE AND BE IN ATTENDANCE WITH THEM FOR 24 HOURS FOLLOWING SURGERY.    Covid testing policy as of 12/06/2022  Your surgeon will notify and schedule you for a COVID test if one is needed before surgery--please direct any questions or COVID symptoms to your surgeon      Questions or Concerns:    - For any questions regarding the day of surgery or your hospital stay, please contact the Pre Admission Nursing Office at [phone].       - If you have health changes between today and your surgery, please call your surgeon.       - For questions after surgery, please call your surgeons office.           Current Visitor Guidelines    2 adult visitors for adult patients in the pre op area    If additional visitors come (beyond a patient care attendant or a group home caregiver), the additional visitors will be asked to wait in the main lobby of the hospital    Visiting hours: 8 a.m. to 8:30 p.m.    Patients confirmed or suspected to have symptoms of COVID 19 or flu:     No visitors  allowed for adult patients.   Children (under age 18) can have 1 named visitor.     People who are sick or showing symptoms of COVID 19 or flu:    Are not allowed to visit patients--we can only make exceptions in special situations.       Please follow these guidelines for your visit:          Please maintain social distance          Masking is optional--however at times you may be asked to wear a mask for the safety of yourself and others     Clean your hands with alcohol hand . Do this when you arrive at and leave the building and patient room,    And again after you touch your mask or anything in the room.     Go directly to and from the room you are visiting.     Stay in the patient s room during your visit. Limit going to other places in the hospital as much as possible     Leave bags and jackets at home or in the car.     For everyone s health, please don t come and go during your visit. That includes for smoking   during your visit.

## 2025-06-15 ENCOUNTER — HEALTH MAINTENANCE LETTER (OUTPATIENT)
Age: 70
End: 2025-06-15

## 2025-06-30 ENCOUNTER — APPOINTMENT (OUTPATIENT)
Dept: GENERAL RADIOLOGY | Facility: CLINIC | Age: 70
End: 2025-06-30
Attending: ORTHOPAEDIC SURGERY
Payer: COMMERCIAL

## 2025-06-30 ENCOUNTER — HOSPITAL ENCOUNTER (INPATIENT)
Facility: CLINIC | Age: 70
LOS: 2 days | Discharge: HOME | End: 2025-07-02
Attending: ORTHOPAEDIC SURGERY | Admitting: ORTHOPAEDIC SURGERY
Payer: COMMERCIAL

## 2025-06-30 ENCOUNTER — ANESTHESIA (OUTPATIENT)
Dept: SURGERY | Facility: CLINIC | Age: 70
End: 2025-06-30
Payer: COMMERCIAL

## 2025-06-30 DIAGNOSIS — M54.12 CERVICAL RADICULITIS: Primary | ICD-10-CM

## 2025-06-30 DIAGNOSIS — M47.812 CERVICAL SPONDYLOSIS WITHOUT MYELOPATHY: ICD-10-CM

## 2025-06-30 DIAGNOSIS — G89.18 ACUTE POST-OPERATIVE PAIN: ICD-10-CM

## 2025-06-30 DIAGNOSIS — M54.12 CERVICAL RADICULOPATHY: ICD-10-CM

## 2025-06-30 DIAGNOSIS — M48.02 FORAMINAL STENOSIS OF CERVICAL REGION: ICD-10-CM

## 2025-06-30 PROBLEM — Z98.1 STATUS POST CERVICAL ARTHRODESIS: Status: ACTIVE | Noted: 2025-06-30

## 2025-06-30 LAB — GLUCOSE BLDC GLUCOMTR-MCNC: 96 MG/DL (ref 70–99)

## 2025-06-30 PROCEDURE — 250N000009 HC RX 250: Performed by: NURSE ANESTHETIST, CERTIFIED REGISTERED

## 2025-06-30 PROCEDURE — 250N000011 HC RX IP 250 OP 636: Mod: JZ | Performed by: NURSE ANESTHETIST, CERTIFIED REGISTERED

## 2025-06-30 PROCEDURE — 250N000013 HC RX MED GY IP 250 OP 250 PS 637

## 2025-06-30 PROCEDURE — 8E09XBZ COMPUTER ASSISTED PROCEDURE OF HEAD AND NECK REGION: ICD-10-PCS | Performed by: ORTHOPAEDIC SURGERY

## 2025-06-30 PROCEDURE — 250N000011 HC RX IP 250 OP 636: Performed by: NURSE ANESTHETIST, CERTIFIED REGISTERED

## 2025-06-30 PROCEDURE — 250N000011 HC RX IP 250 OP 636: Performed by: PHYSICIAN ASSISTANT

## 2025-06-30 PROCEDURE — 4A10X4G MONITORING OF CENTRAL NERVOUS ELECTRICAL ACTIVITY, INTRAOPERATIVE, EXTERNAL APPROACH: ICD-10-PCS | Performed by: ORTHOPAEDIC SURGERY

## 2025-06-30 PROCEDURE — 258N000003 HC RX IP 258 OP 636

## 2025-06-30 PROCEDURE — 258N000003 HC RX IP 258 OP 636: Performed by: PHYSICIAN ASSISTANT

## 2025-06-30 PROCEDURE — 710N000010 HC RECOVERY PHASE 1, LEVEL 2, PER MIN: Performed by: ORTHOPAEDIC SURGERY

## 2025-06-30 PROCEDURE — 250N000011 HC RX IP 250 OP 636: Mod: JW | Performed by: ANESTHESIOLOGY

## 2025-06-30 PROCEDURE — 370N000017 HC ANESTHESIA TECHNICAL FEE, PER MIN: Performed by: ORTHOPAEDIC SURGERY

## 2025-06-30 PROCEDURE — 258N000003 HC RX IP 258 OP 636: Performed by: NURSE ANESTHETIST, CERTIFIED REGISTERED

## 2025-06-30 PROCEDURE — 272N000001 HC OR GENERAL SUPPLY STERILE: Performed by: ORTHOPAEDIC SURGERY

## 2025-06-30 PROCEDURE — 01N10ZZ RELEASE CERVICAL NERVE, OPEN APPROACH: ICD-10-PCS | Performed by: ORTHOPAEDIC SURGERY

## 2025-06-30 PROCEDURE — C1762 CONN TISS, HUMAN(INC FASCIA): HCPCS | Performed by: ORTHOPAEDIC SURGERY

## 2025-06-30 PROCEDURE — 250N000025 HC SEVOFLURANE, PER MIN: Performed by: ORTHOPAEDIC SURGERY

## 2025-06-30 PROCEDURE — 0RG10K1 FUSION OF CERVICAL VERTEBRAL JOINT WITH NONAUTOLOGOUS TISSUE SUBSTITUTE, POSTERIOR APPROACH, POSTERIOR COLUMN, OPEN APPROACH: ICD-10-PCS | Performed by: ORTHOPAEDIC SURGERY

## 2025-06-30 PROCEDURE — 999N000141 HC STATISTIC PRE-PROCEDURE NURSING ASSESSMENT: Performed by: ORTHOPAEDIC SURGERY

## 2025-06-30 PROCEDURE — 20930 SP BONE ALGRFT MORSEL ADD-ON: CPT | Mod: GC | Performed by: ORTHOPAEDIC SURGERY

## 2025-06-30 PROCEDURE — 360N000086 HC SURGERY LEVEL 6 W/ FLUORO, PER MIN: Performed by: ORTHOPAEDIC SURGERY

## 2025-06-30 PROCEDURE — 22600 ARTHRD PST TQ 1NTRSPC CRV: CPT | Mod: 22 | Performed by: ORTHOPAEDIC SURGERY

## 2025-06-30 PROCEDURE — 999N000182 XR SURGERY OARM

## 2025-06-30 PROCEDURE — 250N000009 HC RX 250: Performed by: PHYSICIAN ASSISTANT

## 2025-06-30 PROCEDURE — 250N000013 HC RX MED GY IP 250 OP 250 PS 637: Performed by: PHYSICIAN ASSISTANT

## 2025-06-30 PROCEDURE — 61783 SCAN PROC SPINAL: CPT | Mod: 59 | Performed by: ORTHOPAEDIC SURGERY

## 2025-06-30 PROCEDURE — 120N000002 HC R&B MED SURG/OB UMMC

## 2025-06-30 PROCEDURE — A6010 COLLAGEN BASED WOUND FILLER: HCPCS | Performed by: ORTHOPAEDIC SURGERY

## 2025-06-30 PROCEDURE — 22840 INSERT SPINE FIXATION DEVICE: CPT | Mod: GC | Performed by: ORTHOPAEDIC SURGERY

## 2025-06-30 PROCEDURE — C1713 ANCHOR/SCREW BN/BN,TIS/BN: HCPCS | Performed by: ORTHOPAEDIC SURGERY

## 2025-06-30 PROCEDURE — 99222 1ST HOSP IP/OBS MODERATE 55: CPT

## 2025-06-30 PROCEDURE — 250N000011 HC RX IP 250 OP 636: Mod: JW

## 2025-06-30 PROCEDURE — 63045 LAM FACETEC & FORAMOT CRV: CPT | Mod: 22 | Performed by: ORTHOPAEDIC SURGERY

## 2025-06-30 DEVICE — IMP SCREW INFINITY SPINE MULTIAXIAL14MMX3.5MM 3603514: Type: IMPLANTABLE DEVICE | Site: POSTERIOR CERVICAL | Status: FUNCTIONAL

## 2025-06-30 DEVICE — IMPLANTABLE DEVICE: Type: IMPLANTABLE DEVICE | Site: POSTERIOR CERVICAL | Status: FUNCTIONAL

## 2025-06-30 DEVICE — ROD SPNL 25MM 3.5MM PCUT: Type: IMPLANTABLE DEVICE | Site: POSTERIOR CERVICAL | Status: FUNCTIONAL

## 2025-06-30 DEVICE — GRAFT BN CANC 15CC CRSH 1-10MM 800103: Type: IMPLANTABLE DEVICE | Site: POSTERIOR CERVICAL | Status: FUNCTIONAL

## 2025-06-30 DEVICE — IMP SET SCREW MEDTRONIC INFINITY 3600215: Type: IMPLANTABLE DEVICE | Site: POSTERIOR CERVICAL | Status: FUNCTIONAL

## 2025-06-30 RX ORDER — CEFAZOLIN SODIUM/WATER 2 G/20 ML
2 SYRINGE (ML) INTRAVENOUS SEE ADMIN INSTRUCTIONS
Status: DISCONTINUED | OUTPATIENT
Start: 2025-06-30 | End: 2025-06-30 | Stop reason: HOSPADM

## 2025-06-30 RX ORDER — SODIUM CHLORIDE, SODIUM LACTATE, POTASSIUM CHLORIDE, CALCIUM CHLORIDE 600; 310; 30; 20 MG/100ML; MG/100ML; MG/100ML; MG/100ML
INJECTION, SOLUTION INTRAVENOUS CONTINUOUS
Status: DISCONTINUED | OUTPATIENT
Start: 2025-06-30 | End: 2025-06-30 | Stop reason: HOSPADM

## 2025-06-30 RX ORDER — NALOXONE HYDROCHLORIDE 0.4 MG/ML
0.2 INJECTION, SOLUTION INTRAMUSCULAR; INTRAVENOUS; SUBCUTANEOUS
Status: DISCONTINUED | OUTPATIENT
Start: 2025-06-30 | End: 2025-07-02 | Stop reason: HOSPADM

## 2025-06-30 RX ORDER — PROCHLORPERAZINE MALEATE 5 MG/1
5 TABLET ORAL EVERY 6 HOURS PRN
Status: CANCELLED | OUTPATIENT
Start: 2025-06-30

## 2025-06-30 RX ORDER — ONDANSETRON 2 MG/ML
INJECTION INTRAMUSCULAR; INTRAVENOUS PRN
Status: DISCONTINUED | OUTPATIENT
Start: 2025-06-30 | End: 2025-06-30

## 2025-06-30 RX ORDER — LIDOCAINE 40 MG/G
CREAM TOPICAL
Status: CANCELLED | OUTPATIENT
Start: 2025-06-30

## 2025-06-30 RX ORDER — BISACODYL 10 MG
10 SUPPOSITORY, RECTAL RECTAL DAILY PRN
Status: DISCONTINUED | OUTPATIENT
Start: 2025-07-03 | End: 2025-07-02 | Stop reason: HOSPADM

## 2025-06-30 RX ORDER — MEMANTINE HYDROCHLORIDE 5 MG/1
5 TABLET ORAL 2 TIMES DAILY
COMMUNITY

## 2025-06-30 RX ORDER — DILTIAZEM HYDROCHLORIDE 120 MG/1
240 CAPSULE, COATED, EXTENDED RELEASE ORAL DAILY
Status: DISCONTINUED | OUTPATIENT
Start: 2025-07-01 | End: 2025-07-02 | Stop reason: HOSPADM

## 2025-06-30 RX ORDER — ONDANSETRON 2 MG/ML
4 INJECTION INTRAMUSCULAR; INTRAVENOUS EVERY 6 HOURS PRN
Status: CANCELLED | OUTPATIENT
Start: 2025-06-30

## 2025-06-30 RX ORDER — BUPROPION HYDROCHLORIDE 75 MG/1
1 TABLET ORAL DAILY
COMMUNITY
Start: 2025-05-05

## 2025-06-30 RX ORDER — ALBUTEROL SULFATE 90 UG/1
2 INHALANT RESPIRATORY (INHALATION) EVERY 4 HOURS PRN
Status: DISCONTINUED | OUTPATIENT
Start: 2025-06-30 | End: 2025-07-02 | Stop reason: HOSPADM

## 2025-06-30 RX ORDER — CEFAZOLIN SODIUM 2 G/50ML
2 SOLUTION INTRAVENOUS EVERY 8 HOURS
Status: COMPLETED | OUTPATIENT
Start: 2025-06-30 | End: 2025-07-01

## 2025-06-30 RX ORDER — FENTANYL CITRATE 50 UG/ML
50 INJECTION, SOLUTION INTRAMUSCULAR; INTRAVENOUS EVERY 5 MIN PRN
Status: DISCONTINUED | OUTPATIENT
Start: 2025-06-30 | End: 2025-06-30 | Stop reason: HOSPADM

## 2025-06-30 RX ORDER — ROSUVASTATIN CALCIUM 5 MG/1
5 TABLET, COATED ORAL EVERY MORNING
Status: DISCONTINUED | OUTPATIENT
Start: 2025-07-01 | End: 2025-07-02 | Stop reason: HOSPADM

## 2025-06-30 RX ORDER — LIDOCAINE HYDROCHLORIDE 20 MG/ML
INJECTION, SOLUTION INFILTRATION; PERINEURAL PRN
Status: DISCONTINUED | OUTPATIENT
Start: 2025-06-30 | End: 2025-06-30

## 2025-06-30 RX ORDER — METHOCARBAMOL 750 MG/1
750 TABLET, FILM COATED ORAL EVERY 6 HOURS PRN
Status: DISCONTINUED | OUTPATIENT
Start: 2025-06-30 | End: 2025-07-02 | Stop reason: HOSPADM

## 2025-06-30 RX ORDER — NALOXONE HYDROCHLORIDE 0.4 MG/ML
0.1 INJECTION, SOLUTION INTRAMUSCULAR; INTRAVENOUS; SUBCUTANEOUS
Status: DISCONTINUED | OUTPATIENT
Start: 2025-06-30 | End: 2025-06-30 | Stop reason: HOSPADM

## 2025-06-30 RX ORDER — PANTOPRAZOLE SODIUM 40 MG/1
40 TABLET, DELAYED RELEASE ORAL 2 TIMES DAILY
Status: DISCONTINUED | OUTPATIENT
Start: 2025-06-30 | End: 2025-07-02 | Stop reason: HOSPADM

## 2025-06-30 RX ORDER — NALOXONE HYDROCHLORIDE 0.4 MG/ML
0.4 INJECTION, SOLUTION INTRAMUSCULAR; INTRAVENOUS; SUBCUTANEOUS
Status: DISCONTINUED | OUTPATIENT
Start: 2025-06-30 | End: 2025-07-02 | Stop reason: HOSPADM

## 2025-06-30 RX ORDER — DEXAMETHASONE SODIUM PHOSPHATE 4 MG/ML
4 INJECTION, SOLUTION INTRA-ARTICULAR; INTRALESIONAL; INTRAMUSCULAR; INTRAVENOUS; SOFT TISSUE
Status: DISCONTINUED | OUTPATIENT
Start: 2025-06-30 | End: 2025-06-30 | Stop reason: HOSPADM

## 2025-06-30 RX ORDER — SODIUM CHLORIDE, SODIUM LACTATE, POTASSIUM CHLORIDE, CALCIUM CHLORIDE 600; 310; 30; 20 MG/100ML; MG/100ML; MG/100ML; MG/100ML
INJECTION, SOLUTION INTRAVENOUS CONTINUOUS PRN
Status: DISCONTINUED | OUTPATIENT
Start: 2025-06-30 | End: 2025-06-30

## 2025-06-30 RX ORDER — TRIAMTERENE AND HYDROCHLOROTHIAZIDE 37.5; 25 MG/1; MG/1
1 CAPSULE ORAL EVERY MORNING
Status: DISCONTINUED | OUTPATIENT
Start: 2025-07-01 | End: 2025-07-02 | Stop reason: HOSPADM

## 2025-06-30 RX ORDER — ACETAMINOPHEN 325 MG/1
975 TABLET ORAL EVERY 8 HOURS
Status: DISCONTINUED | OUTPATIENT
Start: 2025-06-30 | End: 2025-07-02 | Stop reason: HOSPADM

## 2025-06-30 RX ORDER — LOSARTAN POTASSIUM 100 MG/1
1 TABLET ORAL
COMMUNITY
Start: 2025-04-27

## 2025-06-30 RX ORDER — ONDANSETRON 2 MG/ML
4 INJECTION INTRAMUSCULAR; INTRAVENOUS EVERY 30 MIN PRN
Status: DISCONTINUED | OUTPATIENT
Start: 2025-06-30 | End: 2025-06-30 | Stop reason: HOSPADM

## 2025-06-30 RX ORDER — FENTANYL CITRATE 50 UG/ML
INJECTION, SOLUTION INTRAMUSCULAR; INTRAVENOUS PRN
Status: DISCONTINUED | OUTPATIENT
Start: 2025-06-30 | End: 2025-06-30

## 2025-06-30 RX ORDER — FAMOTIDINE 20 MG/1
20 TABLET, FILM COATED ORAL 2 TIMES DAILY
Status: DISCONTINUED | OUTPATIENT
Start: 2025-07-01 | End: 2025-06-30

## 2025-06-30 RX ORDER — HYDROMORPHONE HYDROCHLORIDE 1 MG/ML
0.2 INJECTION, SOLUTION INTRAMUSCULAR; INTRAVENOUS; SUBCUTANEOUS EVERY 5 MIN PRN
Status: DISCONTINUED | OUTPATIENT
Start: 2025-06-30 | End: 2025-06-30 | Stop reason: HOSPADM

## 2025-06-30 RX ORDER — VILAZODONE HYDROCHLORIDE 40 MG/1
40 TABLET ORAL EVERY MORNING
Status: DISCONTINUED | OUTPATIENT
Start: 2025-07-01 | End: 2025-07-02 | Stop reason: HOSPADM

## 2025-06-30 RX ORDER — AMOXICILLIN 250 MG
1 CAPSULE ORAL 2 TIMES DAILY
Status: DISCONTINUED | OUTPATIENT
Start: 2025-06-30 | End: 2025-07-02 | Stop reason: HOSPADM

## 2025-06-30 RX ORDER — PROPOFOL 10 MG/ML
INJECTION, EMULSION INTRAVENOUS PRN
Status: DISCONTINUED | OUTPATIENT
Start: 2025-06-30 | End: 2025-06-30

## 2025-06-30 RX ORDER — CEFAZOLIN SODIUM/WATER 2 G/20 ML
2 SYRINGE (ML) INTRAVENOUS
Status: COMPLETED | OUTPATIENT
Start: 2025-06-30 | End: 2025-06-30

## 2025-06-30 RX ORDER — ONDANSETRON 4 MG/1
4 TABLET, ORALLY DISINTEGRATING ORAL EVERY 30 MIN PRN
Status: DISCONTINUED | OUTPATIENT
Start: 2025-06-30 | End: 2025-06-30 | Stop reason: HOSPADM

## 2025-06-30 RX ORDER — SODIUM CHLORIDE 9 MG/ML
INJECTION, SOLUTION INTRAVENOUS CONTINUOUS
Status: DISCONTINUED | OUTPATIENT
Start: 2025-06-30 | End: 2025-07-02 | Stop reason: HOSPADM

## 2025-06-30 RX ORDER — LEVOTHYROXINE SODIUM 175 UG/1
1 TABLET ORAL
COMMUNITY
Start: 2025-04-26

## 2025-06-30 RX ORDER — CALCIUM CARBONATE 500 MG/1
500 TABLET, CHEWABLE ORAL 4 TIMES DAILY PRN
Status: DISCONTINUED | OUTPATIENT
Start: 2025-06-30 | End: 2025-07-02 | Stop reason: HOSPADM

## 2025-06-30 RX ORDER — BUPROPION HYDROCHLORIDE 75 MG/1
75 TABLET ORAL DAILY
Status: DISCONTINUED | OUTPATIENT
Start: 2025-07-01 | End: 2025-07-01

## 2025-06-30 RX ORDER — FENTANYL CITRATE 50 UG/ML
25 INJECTION, SOLUTION INTRAMUSCULAR; INTRAVENOUS EVERY 5 MIN PRN
Status: DISCONTINUED | OUTPATIENT
Start: 2025-06-30 | End: 2025-06-30 | Stop reason: HOSPADM

## 2025-06-30 RX ORDER — EPHEDRINE SULFATE 50 MG/ML
INJECTION, SOLUTION INTRAMUSCULAR; INTRAVENOUS; SUBCUTANEOUS PRN
Status: DISCONTINUED | OUTPATIENT
Start: 2025-06-30 | End: 2025-06-30

## 2025-06-30 RX ORDER — MEMANTINE HYDROCHLORIDE 5 MG/1
5 TABLET ORAL 2 TIMES DAILY
Status: DISCONTINUED | OUTPATIENT
Start: 2025-06-30 | End: 2025-07-02 | Stop reason: HOSPADM

## 2025-06-30 RX ORDER — DEXAMETHASONE SODIUM PHOSPHATE 4 MG/ML
INJECTION, SOLUTION INTRA-ARTICULAR; INTRALESIONAL; INTRAMUSCULAR; INTRAVENOUS; SOFT TISSUE PRN
Status: DISCONTINUED | OUTPATIENT
Start: 2025-06-30 | End: 2025-06-30

## 2025-06-30 RX ORDER — LOSARTAN POTASSIUM 100 MG/1
100 TABLET ORAL DAILY
Status: DISCONTINUED | OUTPATIENT
Start: 2025-07-01 | End: 2025-07-02 | Stop reason: HOSPADM

## 2025-06-30 RX ORDER — GABAPENTIN 100 MG/1
100 CAPSULE ORAL
Status: COMPLETED | OUTPATIENT
Start: 2025-06-30 | End: 2025-06-30

## 2025-06-30 RX ORDER — DIPHENHYDRAMINE HCL 12.5MG/5ML
12.5 LIQUID (ML) ORAL EVERY 6 HOURS PRN
Status: DISCONTINUED | OUTPATIENT
Start: 2025-06-30 | End: 2025-07-02 | Stop reason: HOSPADM

## 2025-06-30 RX ORDER — POLYETHYLENE GLYCOL 3350 17 G/17G
17 POWDER, FOR SOLUTION ORAL DAILY
Status: DISCONTINUED | OUTPATIENT
Start: 2025-07-01 | End: 2025-07-02 | Stop reason: HOSPADM

## 2025-06-30 RX ORDER — ACETAMINOPHEN 325 MG/1
975 TABLET ORAL ONCE
Status: COMPLETED | OUTPATIENT
Start: 2025-06-30 | End: 2025-06-30

## 2025-06-30 RX ORDER — HYDROMORPHONE HYDROCHLORIDE 1 MG/ML
0.2 INJECTION, SOLUTION INTRAMUSCULAR; INTRAVENOUS; SUBCUTANEOUS
Status: DISCONTINUED | OUTPATIENT
Start: 2025-06-30 | End: 2025-07-02 | Stop reason: HOSPADM

## 2025-06-30 RX ORDER — LAMOTRIGINE 150 MG/1
150 TABLET ORAL AT BEDTIME
Status: DISCONTINUED | OUTPATIENT
Start: 2025-06-30 | End: 2025-07-02 | Stop reason: HOSPADM

## 2025-06-30 RX ORDER — BUSPIRONE HYDROCHLORIDE 15 MG/1
15 TABLET ORAL 2 TIMES DAILY
Status: DISCONTINUED | OUTPATIENT
Start: 2025-06-30 | End: 2025-07-02 | Stop reason: HOSPADM

## 2025-06-30 RX ORDER — ONDANSETRON 4 MG/1
4 TABLET, ORALLY DISINTEGRATING ORAL EVERY 6 HOURS PRN
Status: CANCELLED | OUTPATIENT
Start: 2025-06-30

## 2025-06-30 RX ORDER — HYDROMORPHONE HYDROCHLORIDE 1 MG/ML
0.4 INJECTION, SOLUTION INTRAMUSCULAR; INTRAVENOUS; SUBCUTANEOUS EVERY 5 MIN PRN
Status: DISCONTINUED | OUTPATIENT
Start: 2025-06-30 | End: 2025-06-30 | Stop reason: HOSPADM

## 2025-06-30 RX ORDER — LIDOCAINE HYDROCHLORIDE ANHYDROUS AND DEXTROSE MONOHYDRATE .8; 5 G/100ML; G/100ML
1 INJECTION, SOLUTION INTRAVENOUS CONTINUOUS
Status: DISCONTINUED | OUTPATIENT
Start: 2025-06-30 | End: 2025-06-30 | Stop reason: HOSPADM

## 2025-06-30 RX ORDER — KETAMINE HYDROCHLORIDE 10 MG/ML
INJECTION INTRAMUSCULAR; INTRAVENOUS PRN
Status: DISCONTINUED | OUTPATIENT
Start: 2025-06-30 | End: 2025-06-30

## 2025-06-30 RX ORDER — HYDROMORPHONE HYDROCHLORIDE 1 MG/ML
0.4 INJECTION, SOLUTION INTRAMUSCULAR; INTRAVENOUS; SUBCUTANEOUS
Status: DISCONTINUED | OUTPATIENT
Start: 2025-06-30 | End: 2025-07-02 | Stop reason: HOSPADM

## 2025-06-30 RX ORDER — DIPHENHYDRAMINE HYDROCHLORIDE 50 MG/ML
12.5 INJECTION, SOLUTION INTRAMUSCULAR; INTRAVENOUS EVERY 6 HOURS PRN
Status: DISCONTINUED | OUTPATIENT
Start: 2025-06-30 | End: 2025-07-02 | Stop reason: HOSPADM

## 2025-06-30 RX ORDER — FAMOTIDINE 20 MG/1
20 TABLET, FILM COATED ORAL 2 TIMES DAILY
Status: DISCONTINUED | OUTPATIENT
Start: 2025-06-30 | End: 2025-07-02 | Stop reason: HOSPADM

## 2025-06-30 RX ORDER — OXYCODONE HYDROCHLORIDE 5 MG/1
5 TABLET ORAL EVERY 4 HOURS PRN
Status: DISCONTINUED | OUTPATIENT
Start: 2025-06-30 | End: 2025-07-01

## 2025-06-30 RX ORDER — OXYCODONE HYDROCHLORIDE 10 MG/1
10 TABLET ORAL EVERY 4 HOURS PRN
Status: DISCONTINUED | OUTPATIENT
Start: 2025-06-30 | End: 2025-07-01

## 2025-06-30 RX ADMIN — MEMANTINE 5 MG: 5 TABLET ORAL at 21:42

## 2025-06-30 RX ADMIN — LAMOTRIGINE 150 MG: 150 TABLET ORAL at 21:41

## 2025-06-30 RX ADMIN — PHENYLEPHRINE HYDROCHLORIDE 0.7 MCG/KG/MIN: 10 INJECTION INTRAVENOUS at 08:17

## 2025-06-30 RX ADMIN — SUGAMMADEX 200 MG: 100 INJECTION, SOLUTION INTRAVENOUS at 11:27

## 2025-06-30 RX ADMIN — PROPOFOL 150 MG: 10 INJECTION, EMULSION INTRAVENOUS at 07:48

## 2025-06-30 RX ADMIN — Medication 10 MG: at 09:44

## 2025-06-30 RX ADMIN — ONDANSETRON 4 MG: 2 INJECTION INTRAMUSCULAR; INTRAVENOUS at 09:42

## 2025-06-30 RX ADMIN — PANTOPRAZOLE SODIUM 40 MG: 40 TABLET, DELAYED RELEASE ORAL at 21:41

## 2025-06-30 RX ADMIN — PHENYLEPHRINE HYDROCHLORIDE 200 MCG: 10 INJECTION INTRAVENOUS at 08:21

## 2025-06-30 RX ADMIN — DEXAMETHASONE SODIUM PHOSPHATE 10 MG: 4 INJECTION, SOLUTION INTRAMUSCULAR; INTRAVENOUS at 09:23

## 2025-06-30 RX ADMIN — FENTANYL CITRATE 50 MCG: 50 INJECTION INTRAMUSCULAR; INTRAVENOUS at 07:48

## 2025-06-30 RX ADMIN — SODIUM CHLORIDE, SODIUM LACTATE, POTASSIUM CHLORIDE, AND CALCIUM CHLORIDE: .6; .31; .03; .02 INJECTION, SOLUTION INTRAVENOUS at 10:46

## 2025-06-30 RX ADMIN — SODIUM CHLORIDE, SODIUM LACTATE, POTASSIUM CHLORIDE, AND CALCIUM CHLORIDE: .6; .31; .03; .02 INJECTION, SOLUTION INTRAVENOUS at 07:37

## 2025-06-30 RX ADMIN — CEFAZOLIN SODIUM 2 G: 2 SOLUTION INTRAVENOUS at 16:13

## 2025-06-30 RX ADMIN — TRANEXAMIC ACID 853 MG: 100 INJECTION INTRAVENOUS at 08:37

## 2025-06-30 RX ADMIN — METHOCARBAMOL 750 MG: 750 TABLET ORAL at 12:59

## 2025-06-30 RX ADMIN — TRANEXAMIC ACID 5 MG/KG/HR: 100 INJECTION INTRAVENOUS at 08:31

## 2025-06-30 RX ADMIN — Medication 20 MG: at 08:42

## 2025-06-30 RX ADMIN — Medication 20 MG: at 09:09

## 2025-06-30 RX ADMIN — Medication 10 MG: at 10:45

## 2025-06-30 RX ADMIN — HYDROMORPHONE HYDROCHLORIDE 0.4 MG: 1 INJECTION, SOLUTION INTRAMUSCULAR; INTRAVENOUS; SUBCUTANEOUS at 11:48

## 2025-06-30 RX ADMIN — ACETAMINOPHEN 975 MG: 325 TABLET ORAL at 21:41

## 2025-06-30 RX ADMIN — OXYCODONE HYDROCHLORIDE 10 MG: 10 TABLET ORAL at 14:54

## 2025-06-30 RX ADMIN — HYDROMORPHONE HYDROCHLORIDE 0.4 MG: 1 INJECTION, SOLUTION INTRAMUSCULAR; INTRAVENOUS; SUBCUTANEOUS at 12:00

## 2025-06-30 RX ADMIN — Medication 10 MG: at 10:34

## 2025-06-30 RX ADMIN — DEXMEDETOMIDINE HYDROCHLORIDE 4 MCG: 100 INJECTION, SOLUTION INTRAVENOUS at 11:01

## 2025-06-30 RX ADMIN — HYDROMORPHONE HYDROCHLORIDE 0.4 MG: 1 INJECTION, SOLUTION INTRAMUSCULAR; INTRAVENOUS; SUBCUTANEOUS at 12:30

## 2025-06-30 RX ADMIN — GABAPENTIN 100 MG: 100 CAPSULE ORAL at 06:22

## 2025-06-30 RX ADMIN — LEVOTHYROXINE SODIUM 175 MCG: 0.07 TABLET ORAL at 21:41

## 2025-06-30 RX ADMIN — DEXMEDETOMIDINE HYDROCHLORIDE 4 MCG: 100 INJECTION, SOLUTION INTRAVENOUS at 10:45

## 2025-06-30 RX ADMIN — EPHEDRINE SULFATE 10 MG: 5 INJECTION INTRAVENOUS at 08:36

## 2025-06-30 RX ADMIN — EPHEDRINE SULFATE 10 MG: 5 INJECTION INTRAVENOUS at 09:21

## 2025-06-30 RX ADMIN — PHENYLEPHRINE HYDROCHLORIDE 0.6 MCG/KG/MIN: 10 INJECTION INTRAVENOUS at 08:40

## 2025-06-30 RX ADMIN — HYDROMORPHONE HYDROCHLORIDE 0.4 MG: 1 INJECTION, SOLUTION INTRAMUSCULAR; INTRAVENOUS; SUBCUTANEOUS at 23:18

## 2025-06-30 RX ADMIN — Medication 10 MG: at 09:48

## 2025-06-30 RX ADMIN — LIDOCAINE HYDROCHLORIDE 1 MG/KG/HR: 8 INJECTION, SOLUTION INTRAVENOUS at 08:00

## 2025-06-30 RX ADMIN — HYDROMORPHONE HYDROCHLORIDE 0.5 MG: 1 INJECTION, SOLUTION INTRAMUSCULAR; INTRAVENOUS; SUBCUTANEOUS at 08:31

## 2025-06-30 RX ADMIN — HYDROMORPHONE HYDROCHLORIDE 0.4 MG: 1 INJECTION, SOLUTION INTRAMUSCULAR; INTRAVENOUS; SUBCUTANEOUS at 20:53

## 2025-06-30 RX ADMIN — SENNOSIDES AND DOCUSATE SODIUM 1 TABLET: 50; 8.6 TABLET ORAL at 19:36

## 2025-06-30 RX ADMIN — FAMOTIDINE 20 MG: 20 TABLET, FILM COATED ORAL at 19:36

## 2025-06-30 RX ADMIN — HYDROMORPHONE HYDROCHLORIDE 0.2 MG: 1 INJECTION, SOLUTION INTRAMUSCULAR; INTRAVENOUS; SUBCUTANEOUS at 18:18

## 2025-06-30 RX ADMIN — FENTANYL CITRATE 50 MCG: 50 INJECTION INTRAMUSCULAR; INTRAVENOUS at 10:47

## 2025-06-30 RX ADMIN — LIDOCAINE HYDROCHLORIDE 60 MG: 20 INJECTION, SOLUTION INFILTRATION; PERINEURAL at 07:47

## 2025-06-30 RX ADMIN — ACETAMINOPHEN 975 MG: 325 TABLET ORAL at 06:22

## 2025-06-30 RX ADMIN — Medication 10 MG: at 10:04

## 2025-06-30 RX ADMIN — ACETAMINOPHEN 975 MG: 325 TABLET ORAL at 16:14

## 2025-06-30 RX ADMIN — OXYCODONE HYDROCHLORIDE 10 MG: 10 TABLET ORAL at 19:35

## 2025-06-30 RX ADMIN — EPHEDRINE SULFATE 5 MG: 5 INJECTION INTRAVENOUS at 10:12

## 2025-06-30 RX ADMIN — Medication 2 G: at 08:01

## 2025-06-30 RX ADMIN — Medication 50 MG: at 07:48

## 2025-06-30 RX ADMIN — BUSPIRONE HYDROCHLORIDE 15 MG: 15 TABLET ORAL at 21:41

## 2025-06-30 RX ADMIN — HYDROMORPHONE HYDROCHLORIDE 0.2 MG: 1 INJECTION, SOLUTION INTRAMUSCULAR; INTRAVENOUS; SUBCUTANEOUS at 17:51

## 2025-06-30 RX ADMIN — MIDAZOLAM 2 MG: 1 INJECTION INTRAMUSCULAR; INTRAVENOUS at 07:35

## 2025-06-30 RX ADMIN — METHOCARBAMOL 750 MG: 750 TABLET ORAL at 19:36

## 2025-06-30 RX ADMIN — SODIUM CHLORIDE, PRESERVATIVE FREE: 5 INJECTION INTRAVENOUS at 15:01

## 2025-06-30 RX ADMIN — Medication 25 MG: at 21:41

## 2025-06-30 RX ADMIN — Medication 30 MG: at 07:48

## 2025-06-30 RX ADMIN — HYDROMORPHONE HYDROCHLORIDE 0.4 MG: 1 INJECTION, SOLUTION INTRAMUSCULAR; INTRAVENOUS; SUBCUTANEOUS at 13:25

## 2025-06-30 ASSESSMENT — ACTIVITIES OF DAILY LIVING (ADL)
ADLS_ACUITY_SCORE: 15
ADLS_ACUITY_SCORE: 19
ADLS_ACUITY_SCORE: 15
ADLS_ACUITY_SCORE: 19
ADLS_ACUITY_SCORE: 19
ADLS_ACUITY_SCORE: 15
ADLS_ACUITY_SCORE: 19
ADLS_ACUITY_SCORE: 15
ADLS_ACUITY_SCORE: 19
ADLS_ACUITY_SCORE: 19
ADLS_ACUITY_SCORE: 15
ADLS_ACUITY_SCORE: 19
ADLS_ACUITY_SCORE: 18
ADLS_ACUITY_SCORE: 15
ADLS_ACUITY_SCORE: 15

## 2025-06-30 NOTE — ANESTHESIA CARE TRANSFER NOTE
Patient: Alton Bronson    Procedure: Procedure(s):  Posterior instrumented spinal fusion cervical 3-4 with right foraminotomy/facetectomy; use of allograft.       Diagnosis: Foraminal stenosis of cervical region [M48.02]  Cervical spondylosis without myelopathy [M47.812]  Cervical radiculopathy [M54.12]  Diagnosis Additional Information: No value filed.    Anesthesia Type:   General     Note:      Level of Consciousness: awake  Oxygen Supplementation: face mask  Level of Supplemental Oxygen (L/min / FiO2): 6  Independent Airway: airway patency satisfactory and stable  Dentition: dentition unchanged  Vital Signs Stable: post-procedure vital signs reviewed and stable  Report to RN Given: handoff report given  Patient transferred to: PACU    Handoff Report: Identifed the Patient, Identified the Reponsible Provider, Reviewed the pertinent medical history, Discussed the surgical course, Reviewed Intra-OP anesthesia mangement and issues during anesthesia, Set expectations for post-procedure period and Allowed opportunity for questions and acknowledgement of understanding      Vitals:  Vitals Value Taken Time   /89 06/30/25 11:33   Temp 36.6 ax 6/30/25 1133   Pulse 89 06/30/25 11:44   Resp 13 06/30/25 11:44   SpO2 95 % 06/30/25 11:44   Vitals shown include unfiled device data.    Electronically Signed By: VYETTE Crocker CRNA  June 30, 2025  11:44 AM

## 2025-06-30 NOTE — OR NURSING
Dysphagia test performed at bedside, Pt swallowed 3 oz of water easily and with no coughing or choking. Regular diet released.

## 2025-06-30 NOTE — PLAN OF CARE
"Goal Outcome Evaluation:      Plan of Care Reviewed With: patient, spouse    Overall Patient Progress: no changeOverall Patient Progress: no change    Outcome Evaluation: See progress note.    Care for patient from [id number]  VS: /66   Pulse 76   Temp 98.2  F (36.8  C) (Oral)   Resp 14   Ht 1.854 m (6' 1\")   Wt 82.2 kg (181 lb 3.5 oz)   SpO2 92%   BMI 23.91 kg/m     O2: Satting lower 90s on 3L NC, patient has history right phrenic nerve palsy 3L NC (baseline 90-93% on RA per pt)    Output: Merino in place with adequate drainage   Last BM: 6/28/25, continent   Activity: SBA   Skin: Per report, previous nurse did 2-nurse skin check and reported no concerns about skin except the cervical surgical incision   Pain: Managed w/ prn oxycodone and scheduled tylenol   CMS: A/O x4, denied N/T   Dressing: Dressing to cervical incision has a small to moderate amount of drainage on it   Diet: Reg/thin/whole   LDA: L PIV SL & 1 hemovac with minimal output   Plan: TBD   Additional Info:              "

## 2025-06-30 NOTE — OP NOTE
Date of Service:  6/30/2025       Surgeon:  Tre Lucas MD   Assistant:  (1) Brooks Bansal MD, Spine fellow.  (2) Bulmaro Castano MD PGY-4.      Preoperative Diagnosis:     1.  Cervical pain and radiculopathy  2.  Pseudomonas colonization of lungs and sinuses.   3.  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      Postoperative Diagnosis:     Same      Procedures:   Bilateral posterior instrumentation C3-4.  Posterior spinal fusion C3-4, using crushed cancellous allograft.  Right foraminotomy and complete facetectomy C3-4, with decompression of right C4 nerve root.  Use of operating microscope.  Application of Meyers cranial tongs for operative positioning.  Computer navigation using O-arm and Stealth.    A-22 modifier is justified for use in this case given the advanced spondylosis and facet arthropathy especially on the right side at C3-4, necessitating significant extra time and effort greater than 25% usual in performing decompression (foraminotomies/facetectomy) and posterior instrumentation.  Anesthesia:  General endotracheal.   Local anesthetic:  N/A; patient received intraoperative IV lidocaine infusion.  EBL: 150 mL.  Complications:  None apparent.   Implants / Equipment used:   Medtronic Infinity screw system: C3 = 3.5 x 14 mm bilateral; C4 = 3.5 x 14 mm bilateral.  3.5 mm x 25 mm precut pre-bent titanium rods x 2.  TXA medium dose 10/5.  Vancomycin powder 1 gm deep.  Crushed cancellous allograft 15 mL.    Indications:  70 year old male with chronic neck and trapezial pain.  Imaging revealed multilevel cervical spondylosis, right foraminal stenosis C3-4.  Had good pain relief from Right C4 SNRB.  Tried nonoperative treatment, continues to have significant disabling symptoms.  I thus offered surgery in form of posterior instrumented spinal fusion with right foraminotomy C3-4.  Consented after thorough discussion of the rationale, risks, benefits and alternatives.        Details:   Properly identified in preop area, site marked, informed consent signed.  Wheeled to OR.  Brief earlier performed.  General anesthesia administered.  Merino inserted.  Antibiotic given: Cefazolin IV.  TXA started.  Neuromonitoring electrodes placed by [employer] technician.  Meyers cranial tongs applied.  Patient turned prone on Trios table with combo pads and large hip pads.  Meyers tongs attached to Levo head positioner.  Positoned neck neutral.  Arms wrapped in purple foam arm wraps; tucked on the sides on top of large hip pads.  Shoulders taped down.  Lower hairline clipped.  Posterior neck squared off, prepped with chlorhexidine scrub, alcohol and chloraprep and draped in sterile fashion.  Surgical timeout performed.     Posterior midline incision made over approximate C3-C5.  Bilateral superiosteal exposure performed.  Osteotomized bifid portions of cervical spinous processes to facilitate subperiosteal exposure out the lateral masses.  Kocher clamps applied over presumed C4 and C5 spinous processes.  Lateral O-arm image showed collapse at C5 and C6 spinous processes; verified by radiologist; confirmatory timeout performed.  Completed exposure from C3-C5.  Spinous process clamp applied C6, oriented caudally.  O-arm 3D scan obtained of cervical spine for navigation purposes.     Posterior screws placed C3 and C4 using navigation.  Used lateral mass screw trajectories.  Starting points identified using anatomic landmarks.   holes created using gita.  Bony tracks created using jeny drill with 14 mm stop.  Used 3.0 mm jeny taps.  C3-4 facet joints decorticated using gita prior to placing screws.  Screws inserted using jeny .  Check scan showed good placement of all screws.    Microscope brought in.  Right foraminotomy and complete facetectomy C3-4 performed.  Of note, there was significant facet arthropathy and overgrown osteophytes noted at this level.  Thus, necessitated significant extra time and  effort to perform decompression.  We used a variety of instruments including gita, 1 mm and 2 mm Kerrisons, small cervical curettes and nerve hook.  We were able to perform complete facetectomy and completely unroofed the C3-4 foramen, thus decompressing the right C4 nerve root.  Microscope removed.  Epidural bleeders controlled using bipolar cautery, Surgi-Haider and cottonoids.    Used precut pre-bent titanium rods.  Rods seated; set plugs applied.  AP and lateral O-arm images obtained; showed good position and placement of implants.  O-arm removed.  Construct final tightened.    Irrisept irrigation performed.  Posterior fusion performed C3-4.  Posterior elements (facet joints, laminae, lateral masses, spinous processes) decorticated using gita.  Bone graft (crushed cancellous allograft) applied.      Vancomycin powder 1 g applied deep.  Deep medium Hemovac drain.  Closure performed in layers using #1 Vicryl, 0 Vicryl, 2-0 Vicryl and 3-0 Monocryl.  Skin glue and sterile dressings applied.  Patient turned supine; tongs removed.  Taken off general anesthetic.  Transferred to recovery room in satisfactory condition.      Postop:  Admit to surgical floor.  Continue IV lidocaine until POD#2.  Continue antibiotics x 24 hrs.  Mechanical DVT prophylaxis.  Hospitalist medical co-management.  PT and OT consult.  No lifting more than 10 pounds, avoid excessive bending or twisting.  Soft collar for comfort.  Cervical CT postop.  Cervical ap-lat x-rays prior to discharge.  Anticipate discharge in 1-2 days.  RTC 6 weeks with cervical AP-lat standing x-rays.

## 2025-06-30 NOTE — PLAN OF CARE
Goal Outcome Evaluation:      VS: Vital signs:  Temp: 98.2  F (36.8  C) Temp src: Oral BP: 97/77 Pulse: 87   Resp: 14 SpO2: 92 % O2 Device: Nasal cannula Oxygen Delivery: 3 LPM    O2: On 2L of O2 via NC. Denies SOB, CP.   Output: Merino   Last BM: 6/28/25   Activity: A1-2 walker/GB   Skin: Intact, exception to neck incision   Pain: Oxy for pain   CMS: A/Ox4. Able to make needs known to staff.  Denies n/v, n/t.    Dressing: Neck incision intact, CDI.   Diet: Regular/Thin/whole   LDA: Hemo vac   Equipment: IV pole, personal belongings and call light within reach.   Plan: Continue with plan of care.   Additional Info:  Endorsed to Bela Hernandez

## 2025-06-30 NOTE — ANESTHESIA POSTPROCEDURE EVALUATION
Patient: Alton Bronson    Procedure: Procedure(s):  Posterior instrumented spinal fusion cervical 3-4 with right foraminotomy/facetectomy; use of allograft.       Anesthesia Type:  General    Note:  Disposition: Outpatient   Postop Pain Control: Uneventful            Sign Out: Well controlled pain   PONV: No   Neuro/Psych: Uneventful            Sign Out: Acceptable/Baseline neuro status   Airway/Respiratory: Uneventful            Sign Out: Acceptable/Baseline resp. status   CV/Hemodynamics: Uneventful            Sign Out: Acceptable CV status; No obvious hypovolemia; No obvious fluid overload   Other NRE:    DID A NON-ROUTINE EVENT OCCUR?            Last vitals:  Vitals Value Taken Time   /79 06/30/25 13:45   Temp 36.6  C (97.9  F) 06/30/25 13:25   Pulse 87 06/30/25 13:54   Resp 17 06/30/25 13:54   SpO2 89 % 06/30/25 13:54   Vitals shown include unfiled device data.    Electronically Signed By: Sofía Barreto MD  June 30, 2025  3:17 PM

## 2025-06-30 NOTE — OR NURSING
"PACU to Inpatient Nursing Handoff    Patient Alton Bronson is a 70 year old male who speaks English.   Procedure Procedure(s):  Posterior instrumented spinal fusion cervical 3-4 with right foraminotomy/facetectomy; use of allograft.   Surgeon(s) Primary: Tre Lucas MD  Resident - Assisting: Bulmaro Castano MD  Fellow - Assisting: Brooks Bansal MD     Allergies   Allergen Reactions    Levofloxacin Other (See Comments)     Tendonitis    Lisinopril      Allergist suggested lisinopril might be adding to his cough every, 2018.  Started losartan in its place but could try lisinopril again in the future as a trial if desired.    Simvastatin      PN: LW Reaction: myalgias at 80mg       Isolation  [unfilled]     Past Medical History   has a past medical history of ADD (attention deficit disorder), Anxiety, Cervical radiculopathy, Depression, Foraminal stenosis of cervical region, Gastroesophageal reflux disease with esophagitis, Gastroparesis, Hypertension, Osteoarthritis, Pleural effusion, Thyroid disease, and Uncomplicated asthma.    Anesthesia General   Dermatome Level     Preop Meds acetaminophen (Tylenol) - time given: 0622  gabapentin (Neurontin) - time given: 0622   Nerve block Not applicable   Intraop Meds fentanyl (Sublimaze): 100 mcg total  hydromorphone (Dilaudid): 0.5 mg total  ketamine (Ketalar): 50 mg given  ondansetron (Zofran): last given at 0942   Local Meds No   Antibiotics cefazolin (Ancef) - last given at 0801     Pain Patient Currently in Pain: yes   PACU meds  hydromorphone (Dilaudid): 1.6 mg (total dose) last given at 1330  Robaxin 750mg PO at 1300   PCA / epidural No   Capnography     Telemetry ECG Rhythm: Sinus rhythm   Inpatient Telemetry Monitor Ordered? No        Labs Glucose Lab Results   Component Value Date    GLC 96 06/30/2025       Hgb Lab Results   Component Value Date    HGB 13.2 06/12/2025       INR No results found for: \"INR\"   PACU Imaging Not applicable "     Wound/Incision Wound Bilateral Head Other (comment) (Active)   Number of days: 0       Incision/Surgical Site 11/28/22 Right Foot (Active)   Number of days: 945       Incision/Surgical Site 06/30/25 Posterior Cervical spine (Active)   Incision Assessment UTV 06/30/25 1226   Dressing Intervention Clean, dry, intact 06/30/25 1226   Number of days: 0      CMS        Equipment Soft collar   Other LDA       IV Access Peripheral IV 06/30/25 Right Lower forearm (Active)   Site Assessment WDL 06/30/25 1226   Line Status Infusing 06/30/25 1133   Dressing Transparent 06/30/25 0615   Dressing Status clean;dry;intact 06/30/25 0615   Dressing Intervention New dressing  06/30/25 0615   Line Intervention Flushed 06/30/25 0615   Line Necessity Yes, meets criteria 06/30/25 0615   Phlebitis Scale 0-->no symptoms 06/30/25 1133   Infiltration? no 06/30/25 0615   Number of days: 0      Blood Products Not applicable  mL   Intake/Output Date 06/30/25 0700 - 07/01/25 0659   Shift 2264-8266 9536-6638 7414-0584 24 Hour Total   INTAKE   I.V. 1000   1000   Shift Total(mL/kg) 1000(12.17)   1000(12.17)   OUTPUT   Urine 300   300   Drains 0   0   Shift Total(mL/kg) 300(3.65)   300(3.65)   Weight (kg) 82.2 82.2 82.2 82.2      Drains / Hernandez Drain Closed/Suction 1 Posterior Neck Accordion 10 Sami (Active)   Site Description WDL 06/30/25 1133   Output (mL) 0 ml 06/30/25 1133   Number of days: 0      Time of void PreOp Time of Void Prior to Procedure: 0550 (06/30/25 0624)    PostOp      Diapered? No   Bladder Scan  hernandez   PO    tolerating sips     Vitals    B/P: 115/82  T: 97.9  F (36.6  C)    Temp src: Axillary  P:  Pulse: 88 (06/30/25 1215)          R: 12  O2:  SpO2: 95 %    O2 Device: Simple face mask (06/30/25 1200)    Oxygen Delivery: 6 LPM (06/30/25 1200)         Family/support present significant other Lisa   Patient belongings     Patient transported on bed   DC meds/scripts (obs/outpt) Not applicable   Inpatient Pain Meds  Released? Yes       Special needs/considerations Pleasant, pain level improving, Pt uses 2l oxygen at night at home.   Tasks needing completion Soft collar is on, Pt passed swallow study at bedside.       Aaron Rossi, RN  [facility]

## 2025-06-30 NOTE — ANESTHESIA PREPROCEDURE EVALUATION
Anesthesia Pre-Procedure Evaluation    Patient: Alton Bronson   MRN: [id number] : 1955          Procedure : Procedure(s):  Posterior instrumented spinal fusion cervical 3-4 with right foraminotomy/facetectomy; use of allograft.         Past Medical History:   Diagnosis Date    ADD (attention deficit disorder)     Anxiety     Cervical radiculopathy     Depression     Foraminal stenosis of cervical region     Gastroesophageal reflux disease with esophagitis     Gastroparesis     Hypertension     Osteoarthritis     Pleural effusion     Thyroid disease     Uncomplicated asthma       Past Surgical History:   Procedure Laterality Date    ARTHRODESIS TOE(S) Right 2022    Procedure: RIGHT FIRST METATARSOPHALANGEAL JOINT ARTHRODESIS,;  Surgeon: Alhaji Louise MD;  Location: [facility] OR    BUNIONECTOMY Right 2022    Procedure: RIGHT BUNIONETTE REPAIR;  Surgeon: Alhaji Louise MD;  Location: [facility] OR    GASTRIC FUNDOPLICATION      HERNIA REPAIR      INJECT EPIDURAL TRANSFORAMINAL Right 3/27/2025    Procedure: C4 Selective Nerve Root Block;  Surgeon: Jacek Yoon MD;  Location: [facility] OR    IR PICC PLACEMENT > 5 YRS OF AGE  3/20/2024    IR PICC PLACEMENT > 5 YRS OF AGE  3/22/2024    IR PICC PLACEMENT > 5 YRS OF AGE  3/25/2024    IR THORACENTESIS  10/10/2023    IR THORACENTESIS  2023    IR THORACENTESIS  2023    IR THORACENTESIS  2023    IR THORACENTESIS  2023    IR THORACENTESIS  2023    IR THORACENTESIS  2023    IR THORACENTESIS  2023    IR THORACENTESIS  2023    IR THORACENTESIS  2023    IR THORACENTESIS  2022    IR THORACENTESIS  2022    IR THORACENTESIS  2022    IR THORACENTESIS  10/18/2022    IR THORACENTESIS  10/10/2022    IR THORACENTESIS  10/3/2022    IR THORACENTESIS  2022    IR THORACENTESIS  2022    IR THORACENTESIS  2022    IR THORACENTESIS  2022    SEPTOPLASTY      TONSILLECTOMY        Allergies   Allergen  "Reactions    Levofloxacin Other (See Comments)     Tendonitis    Lisinopril      Allergist suggested lisinopril might be adding to his cough every, 2018.  Started losartan in its place but could try lisinopril again in the future as a trial if desired.    Simvastatin      PN: LW Reaction: myalgias at 80mg      Social History     Tobacco Use    Smoking status: Former     Types: Cigarettes     Passive exposure: Past    Smokeless tobacco: Never    Tobacco comments:     \"Quit smoking when I was 29\"   Substance Use Topics    Alcohol use: Not Currently      Wt Readings from Last 1 Encounters:   06/30/25 82.2 kg (181 lb 3.5 oz)        Anesthesia Evaluation            ROS/MED HX  ENT/Pulmonary:     (+)                     Moderate Persistent, asthma                  Neurologic:     (+)    peripheral neuropathy,                            Cardiovascular:     (+)  hypertension- -   -  - -                                      METS/Exercise Tolerance:     Hematologic:       Musculoskeletal:  - neg musculoskeletal ROS     GI/Hepatic:  - neg GI/hepatic ROS     Renal/Genitourinary:  - neg Renal ROS     Endo:     (+)          thyroid problem,            Psychiatric/Substance Use:     (+) psychiatric history depression       Infectious Disease:       Malignancy:  - neg malignancy ROS     Other:              Physical Exam  Airway  Mallampati: II  TM distance: >3 FB  Upper bite lip test: II  Mouth opening: >= 4 cm    Cardiovascular   Rhythm: regular  Rate: normal rate     Dental   (+) Minor Abnormalities - some fillings, tiny chips      Pulmonary - normal exam      Neurological - normal exam  He appears awake, alert and oriented x3.    Other Findings       OUTSIDE LABS:  CBC:   Lab Results   Component Value Date    WBC 5.2 06/12/2025    HGB 13.2 (L) 06/12/2025    HCT 40.4 06/12/2025     06/12/2025     BMP:   Lab Results   Component Value Date     06/12/2025    POTASSIUM 4.0 06/12/2025    POTASSIUM 3.5 11/28/2022    " "CHLORIDE 102 06/12/2025    CO2 27 06/12/2025    BUN 17.0 06/12/2025    CR 0.90 06/12/2025    GLC 96 06/30/2025    GLC 97 06/12/2025     COAGS: No results found for: \"PTT\", \"INR\", \"FIBR\"  POC: No results found for: \"BGM\", \"HCG\", \"HCGS\"  HEPATIC: No results found for: \"ALBUMIN\", \"PROTTOTAL\", \"ALT\", \"AST\", \"GGT\", \"ALKPHOS\", \"BILITOTAL\", \"BILIDIRECT\", \"RODRIGUEZ\"  OTHER:   Lab Results   Component Value Date    MICHAEL 9.7 06/12/2025       Anesthesia Plan    ASA Status:  3      NPO Status: NPO Appropriate   Anesthesia Type: General.  Airway: oral.  Induction: intravenous.  Maintenance: Balanced.   Techniques and Equipment:       - Monitoring Plan: standard ASA monitoring, train of four monitoring, electrophysiologic monitoring     Consents    Anesthesia Plan(s) and associated risks, benefits, and realistic alternatives discussed. Questions answered and patient/representative(s) expressed understanding.     - Discussed: CRNA     - Discussed with:  Patient               Postoperative Care    Pain management: multimodal analgesia.     Comments:                   YVETTE Crocker CRNA    I have reviewed the pertinent notes and labs in the chart from the past 30 days and (re)examined the patient.  Any updates or changes from those notes are reflected in this note.    Clinically Significant Risk Factors Present on Admission                 # Drug Induced Platelet Defect: home medication list includes an antiplatelet medication   # Hypertension: Home medication list includes antihypertensive(s)                            "

## 2025-06-30 NOTE — ANESTHESIA PROCEDURE NOTES
Airway         Procedure Start/Stop Times: 6/30/2025 8:35 AM  Staff -        CRNA: Dionna Christianson APRN CRNA       Performed By: CRNAIndications and Patient Condition       Indications for airway management: bernardo-procedural       Induction type:intravenous       Mask difficulty assessment: 2 - vent by mask + OA or adjuvant +/- NMBA    Final Airway Details       Final airway type: endotracheal airway       Successful airway: ETT - single  Endotracheal Airway Details        ETT size (mm): 8.0       Cuffed: yes       Successful intubation technique: video laryngoscopy       VL Blade Size: MAC 4       Grade View of Cords: 1       Adjucts: stylet       Position: Left       Measured from: gums/teeth       Secured at (cm): 24       Bite block used: None    Post intubation assessment        Placement verified by: capnometry, equal breath sounds and chest rise        Number of attempts at approach: 1       Number of other approaches attempted: 0       Secured with: tape       Ease of procedure: easy       Dentition: Intact    Medication(s) Administered   Medication Administration Time: 6/30/2025 8:35 AM    Additional Comments       Patient with full beard. Two handed mask with oral airway to achieve good seal during bvm ventilation.

## 2025-07-01 ENCOUNTER — APPOINTMENT (OUTPATIENT)
Dept: GENERAL RADIOLOGY | Facility: CLINIC | Age: 70
End: 2025-07-01
Attending: ORTHOPAEDIC SURGERY
Payer: COMMERCIAL

## 2025-07-01 ENCOUNTER — APPOINTMENT (OUTPATIENT)
Dept: OCCUPATIONAL THERAPY | Facility: CLINIC | Age: 70
DRG: 473 | End: 2025-07-01
Attending: ORTHOPAEDIC SURGERY
Payer: COMMERCIAL

## 2025-07-01 LAB
ANION GAP SERPL CALCULATED.3IONS-SCNC: 10 MMOL/L (ref 7–15)
BUN SERPL-MCNC: 21.2 MG/DL (ref 8–23)
CALCIUM SERPL-MCNC: 9.1 MG/DL (ref 8.8–10.4)
CHLORIDE SERPL-SCNC: 102 MMOL/L (ref 98–107)
CREAT SERPL-MCNC: 0.83 MG/DL (ref 0.67–1.17)
EGFRCR SERPLBLD CKD-EPI 2021: >90 ML/MIN/1.73M2
ERYTHROCYTE [DISTWIDTH] IN BLOOD BY AUTOMATED COUNT: 13.9 % (ref 10–15)
GLUCOSE BLDC GLUCOMTR-MCNC: 127 MG/DL (ref 70–99)
GLUCOSE SERPL-MCNC: 131 MG/DL (ref 70–99)
HCO3 SERPL-SCNC: 25 MMOL/L (ref 22–29)
HCT VFR BLD AUTO: 35.8 % (ref 40–53)
HGB BLD-MCNC: 11.6 G/DL (ref 13.3–17.7)
MCH RBC QN AUTO: 30 PG (ref 26.5–33)
MCHC RBC AUTO-ENTMCNC: 32.4 G/DL (ref 31.5–36.5)
MCV RBC AUTO: 93 FL (ref 78–100)
PLATELET # BLD AUTO: 241 10E3/UL (ref 150–450)
POTASSIUM SERPL-SCNC: 3.9 MMOL/L (ref 3.4–5.3)
RBC # BLD AUTO: 3.87 10E6/UL (ref 4.4–5.9)
SODIUM SERPL-SCNC: 137 MMOL/L (ref 135–145)
WBC # BLD AUTO: 9.2 10E3/UL (ref 4–11)

## 2025-07-01 PROCEDURE — 99233 SBSQ HOSP IP/OBS HIGH 50: CPT | Performed by: INTERNAL MEDICINE

## 2025-07-01 PROCEDURE — 258N000003 HC RX IP 258 OP 636

## 2025-07-01 PROCEDURE — 250N000013 HC RX MED GY IP 250 OP 250 PS 637

## 2025-07-01 PROCEDURE — 250N000013 HC RX MED GY IP 250 OP 250 PS 637: Performed by: NURSE PRACTITIONER

## 2025-07-01 PROCEDURE — 250N000013 HC RX MED GY IP 250 OP 250 PS 637: Performed by: ORTHOPAEDIC SURGERY

## 2025-07-01 PROCEDURE — 72040 X-RAY EXAM NECK SPINE 2-3 VW: CPT | Mod: 26 | Performed by: RADIOLOGY

## 2025-07-01 PROCEDURE — 250N000011 HC RX IP 250 OP 636: Performed by: NURSE PRACTITIONER

## 2025-07-01 PROCEDURE — 250N000011 HC RX IP 250 OP 636

## 2025-07-01 PROCEDURE — 999N000147 HC STATISTIC PT IP EVAL DEFER: Performed by: PHYSICAL THERAPIST

## 2025-07-01 PROCEDURE — 120N000002 HC R&B MED SURG/OB UMMC

## 2025-07-01 PROCEDURE — 85027 COMPLETE CBC AUTOMATED: CPT

## 2025-07-01 PROCEDURE — 82947 ASSAY GLUCOSE BLOOD QUANT: CPT

## 2025-07-01 PROCEDURE — 97530 THERAPEUTIC ACTIVITIES: CPT | Mod: GO

## 2025-07-01 PROCEDURE — 97165 OT EVAL LOW COMPLEX 30 MIN: CPT | Mod: GO

## 2025-07-01 PROCEDURE — 97535 SELF CARE MNGMENT TRAINING: CPT | Mod: GO

## 2025-07-01 PROCEDURE — 999N000065 XR CERVICAL SPINE 2/3 VIEWS

## 2025-07-01 PROCEDURE — 80048 BASIC METABOLIC PNL TOTAL CA: CPT

## 2025-07-01 PROCEDURE — 36415 COLL VENOUS BLD VENIPUNCTURE: CPT

## 2025-07-01 RX ORDER — BUPROPION HYDROCHLORIDE 75 MG/1
75 TABLET ORAL DAILY
Status: DISCONTINUED | OUTPATIENT
Start: 2025-07-01 | End: 2025-07-02 | Stop reason: HOSPADM

## 2025-07-01 RX ORDER — HYDROMORPHONE HYDROCHLORIDE 2 MG/1
2 TABLET ORAL EVERY 4 HOURS PRN
Refills: 0 | Status: DISCONTINUED | OUTPATIENT
Start: 2025-07-01 | End: 2025-07-02 | Stop reason: HOSPADM

## 2025-07-01 RX ORDER — KETOROLAC TROMETHAMINE 15 MG/ML
15 INJECTION, SOLUTION INTRAMUSCULAR; INTRAVENOUS EVERY 6 HOURS
Status: COMPLETED | OUTPATIENT
Start: 2025-07-02 | End: 2025-07-02

## 2025-07-01 RX ORDER — KETOROLAC TROMETHAMINE 15 MG/ML
15 INJECTION, SOLUTION INTRAMUSCULAR; INTRAVENOUS EVERY 6 HOURS
Status: COMPLETED | OUTPATIENT
Start: 2025-07-01 | End: 2025-07-01

## 2025-07-01 RX ORDER — HYDROMORPHONE HYDROCHLORIDE 4 MG/1
4 TABLET ORAL EVERY 4 HOURS PRN
Refills: 0 | Status: DISCONTINUED | OUTPATIENT
Start: 2025-07-01 | End: 2025-07-02 | Stop reason: HOSPADM

## 2025-07-01 RX ADMIN — MEMANTINE 5 MG: 5 TABLET ORAL at 09:18

## 2025-07-01 RX ADMIN — TRIAMTERENE AND HYDROCHLOROTHIAZIDE 1 CAPSULE: 37.5; 25 CAPSULE ORAL at 09:18

## 2025-07-01 RX ADMIN — Medication 25 MG: at 21:25

## 2025-07-01 RX ADMIN — KETOROLAC TROMETHAMINE 15 MG: 15 INJECTION, SOLUTION INTRAMUSCULAR; INTRAVENOUS at 18:44

## 2025-07-01 RX ADMIN — MEMANTINE 5 MG: 5 TABLET ORAL at 20:33

## 2025-07-01 RX ADMIN — PANTOPRAZOLE SODIUM 40 MG: 40 TABLET, DELAYED RELEASE ORAL at 09:17

## 2025-07-01 RX ADMIN — GABAPENTIN 1500 MG: 300 CAPSULE ORAL at 21:25

## 2025-07-01 RX ADMIN — HYDROMORPHONE HYDROCHLORIDE 0.4 MG: 1 INJECTION, SOLUTION INTRAMUSCULAR; INTRAVENOUS; SUBCUTANEOUS at 12:24

## 2025-07-01 RX ADMIN — HYDROMORPHONE HYDROCHLORIDE 0.4 MG: 1 INJECTION, SOLUTION INTRAMUSCULAR; INTRAVENOUS; SUBCUTANEOUS at 06:01

## 2025-07-01 RX ADMIN — ROSUVASTATIN CALCIUM 5 MG: 5 TABLET, FILM COATED ORAL at 09:17

## 2025-07-01 RX ADMIN — DILTIAZEM HYDROCHLORIDE 240 MG: 120 CAPSULE, COATED, EXTENDED RELEASE ORAL at 09:18

## 2025-07-01 RX ADMIN — ACETAMINOPHEN 975 MG: 325 TABLET ORAL at 22:46

## 2025-07-01 RX ADMIN — BUSPIRONE HYDROCHLORIDE 15 MG: 15 TABLET ORAL at 20:33

## 2025-07-01 RX ADMIN — HYDROMORPHONE HYDROCHLORIDE 0.4 MG: 1 INJECTION, SOLUTION INTRAMUSCULAR; INTRAVENOUS; SUBCUTANEOUS at 01:37

## 2025-07-01 RX ADMIN — METHOCARBAMOL 750 MG: 750 TABLET ORAL at 08:31

## 2025-07-01 RX ADMIN — ACETAMINOPHEN 975 MG: 325 TABLET ORAL at 13:33

## 2025-07-01 RX ADMIN — HYDROMORPHONE HYDROCHLORIDE 4 MG: 4 TABLET ORAL at 22:45

## 2025-07-01 RX ADMIN — ACETAMINOPHEN 975 MG: 325 TABLET ORAL at 06:01

## 2025-07-01 RX ADMIN — VILAZODONE HYDROCHLORIDE 40 MG: 40 TABLET ORAL at 09:18

## 2025-07-01 RX ADMIN — FAMOTIDINE 20 MG: 20 TABLET, FILM COATED ORAL at 09:17

## 2025-07-01 RX ADMIN — SODIUM CHLORIDE, PRESERVATIVE FREE: 5 INJECTION INTRAVENOUS at 01:37

## 2025-07-01 RX ADMIN — POLYETHYLENE GLYCOL 3350 17 G: 17 POWDER, FOR SOLUTION ORAL at 09:18

## 2025-07-01 RX ADMIN — BUPROPION HYDROCHLORIDE 75 MG: 75 TABLET, FILM COATED ORAL at 10:21

## 2025-07-01 RX ADMIN — CEFAZOLIN SODIUM 2 G: 2 SOLUTION INTRAVENOUS at 00:00

## 2025-07-01 RX ADMIN — KETOROLAC TROMETHAMINE 15 MG: 15 INJECTION, SOLUTION INTRAMUSCULAR; INTRAVENOUS at 13:06

## 2025-07-01 RX ADMIN — BUSPIRONE HYDROCHLORIDE 15 MG: 15 TABLET ORAL at 09:17

## 2025-07-01 RX ADMIN — HYDROMORPHONE HYDROCHLORIDE 4 MG: 4 TABLET ORAL at 18:44

## 2025-07-01 RX ADMIN — OXYCODONE HYDROCHLORIDE 10 MG: 10 TABLET ORAL at 04:36

## 2025-07-01 RX ADMIN — FAMOTIDINE 20 MG: 20 TABLET, FILM COATED ORAL at 20:33

## 2025-07-01 RX ADMIN — METHOCARBAMOL 750 MG: 750 TABLET ORAL at 17:04

## 2025-07-01 RX ADMIN — LAMOTRIGINE 150 MG: 150 TABLET ORAL at 21:25

## 2025-07-01 RX ADMIN — LOSARTAN POTASSIUM 100 MG: 100 TABLET, FILM COATED ORAL at 13:06

## 2025-07-01 RX ADMIN — PANTOPRAZOLE SODIUM 40 MG: 40 TABLET, DELAYED RELEASE ORAL at 20:33

## 2025-07-01 RX ADMIN — HYDROMORPHONE HYDROCHLORIDE 2 MG: 2 TABLET ORAL at 14:43

## 2025-07-01 RX ADMIN — OXYCODONE HYDROCHLORIDE 10 MG: 10 TABLET ORAL at 00:00

## 2025-07-01 RX ADMIN — HYDROMORPHONE HYDROCHLORIDE 0.4 MG: 1 INJECTION, SOLUTION INTRAMUSCULAR; INTRAVENOUS; SUBCUTANEOUS at 09:34

## 2025-07-01 RX ADMIN — SENNOSIDES AND DOCUSATE SODIUM 1 TABLET: 50; 8.6 TABLET ORAL at 20:33

## 2025-07-01 RX ADMIN — OXYCODONE HYDROCHLORIDE 10 MG: 10 TABLET ORAL at 08:31

## 2025-07-01 RX ADMIN — HYDROMORPHONE HYDROCHLORIDE 0.4 MG: 1 INJECTION, SOLUTION INTRAMUSCULAR; INTRAVENOUS; SUBCUTANEOUS at 03:54

## 2025-07-01 RX ADMIN — LEVOTHYROXINE SODIUM 175 MCG: 0.07 TABLET ORAL at 12:24

## 2025-07-01 ASSESSMENT — ACTIVITIES OF DAILY LIVING (ADL)
ADLS_ACUITY_SCORE: 20
ADLS_ACUITY_SCORE: 19
ADLS_ACUITY_SCORE: 19
ADLS_ACUITY_SCORE: 20
ADLS_ACUITY_SCORE: 19
ADLS_ACUITY_SCORE: 19
ADLS_ACUITY_SCORE: 20
ADLS_ACUITY_SCORE: 20

## 2025-07-01 NOTE — PHARMACY-ADMISSION MEDICATION HISTORY
Pharmacist Admission Medication History    Admission medication history is complete. The information provided in this note is only as accurate as the sources available at the time of the update.    Information Source(s): Patient via in-person    Pertinent Information: Patient has a med list on phone to confirm dosage.     Changes made to PTA medication list:  Added: None  Deleted: hydralazine  Changed:   Bupropion 150mg to 75mg  Levothyroxine 137mcg to 175mcg  Losartan 25mg to 100mg  Trazodone 50mg to 25mg    Allergies reviewed with patient and updates made in EHR: yes    Medication History Completed By: Gilberto Gonzales [facility] 6/30/2025 9:08 PM    PTA Med List   Medication Sig Last Dose/Taking    acetaminophen (TYLENOL) 650 MG CR tablet Take 1,300 mg by mouth 2 times daily. 6/29/2025    albuterol (PROAIR HFA/PROVENTIL HFA/VENTOLIN HFA) 108 (90 Base) MCG/ACT inhaler Inhale 2 puffs into the lungs every 4 hours as needed. Past Month    buPROPion (WELLBUTRIN) 75 MG tablet Take 1 tablet by mouth daily at 2 pm. 6/30/2025 Morning    busPIRone (BUSPAR) 10 MG tablet Take 15 mg by mouth 2 times daily. 6/30/2025 Morning    famotidine (PEPCID) 20 MG tablet Take 20 mg by mouth 2 times daily 6/30/2025 at  4:30 AM    fish oil-omega-3 fatty acids 1000 MG capsule Take 2 g by mouth daily. Past Month    gabapentin (NEURONTIN) 300 MG capsule Take 1,500 mg by mouth at bedtime. 6/29/2025 at  9:00 PM    gentamicin sulfate POWD Spray in nostril 2 times daily. Past Week    ibuprofen (ADVIL/MOTRIN) 800 MG tablet Take 1 tablet (800 mg) by mouth every 8 hours as needed for moderate pain (4-6) (Patient taking differently: Take 400 mg by mouth 2 times daily.) Past Month    lamoTRIgine (LAMICTAL) 100 MG tablet Take 150 mg by mouth at bedtime. 6/29/2025 at  9:00 PM    levothyroxine (SYNTHROID/LEVOTHROID) 175 MCG tablet Take 1 tablet by mouth daily at 2 pm. 6/30/2025 Morning    losartan (COZAAR) 100 MG tablet Take 1 tablet by mouth daily at 2 pm.  6/30/2025 Morning    melatonin 5 MG tablet Take 5 mg by mouth at bedtime. Past Week    memantine (NAMENDA) 5 MG tablet Take 5 mg by mouth 2 times daily. 6/30/2025 Morning    methocarbamol (ROBAXIN) 500 MG tablet Take 1 tablet (500 mg) by mouth 3 times daily. 6/29/2025 at 12:00 PM    Multiple Vitamin (MULTIVITAMIN ADULT PO) Take by mouth every morning. Past Month    omeprazole (PRILOSEC) 20 MG DR capsule Take 20 mg by mouth 2 times daily. 6/30/2025 at  4:30 AM    Probiotic Product (ALIGN) CAPS Take 1 tablet by mouth every morning. Past Week    rosuvastatin (CRESTOR) 5 MG tablet Take 1 tablet by mouth every morning. Past Week    sildenafil (REVATIO) 20 MG tablet Take 1-5 tablets 1 hour before SA, take on an empty stomach. More than a month    sodium chloride (NEBUSAL) 3 % neb solution Take 3 mLs by nebulization 2 times daily. Past Week    study - aspirin, IDS# [id number], 81 mg tablet Take 81 mg by mouth every morning. Past Month    TIADYLT  MG 24 hr ER beaded capsule Take 240 mg by mouth daily. 6/30/2025 Morning    tobramycin, PF, (AMERICA) 300 MG/5ML neb solution Inhale 300 mg into the lungs two times daily. Past Month    traZODone (DESYREL) 50 MG tablet Take 25 mg by mouth at bedtime. 6/29/2025 Bedtime    triamterene-HCTZ (DYAZIDE) 37.5-25 MG capsule Take 1 capsule by mouth every morning. 6/30/2025 at  4:30 AM    vilazodone (VIIBRYD) 40 MG TABS tablet Take 40 mg by mouth every morning. 6/30/2025 Morning    vitamin D3 (CHOLECALCIFEROL) 125 MCG (5000 UT) tablet every morning. Past Month

## 2025-07-01 NOTE — PLAN OF CARE
Pt remains A/O x 4. Pt is vitally stable. Pt continues to deny SOB, chest pain, N/V. Pt remains stand-by assist with walker. Pt continues to report severe neck & back pain managed per MAR. Cervical dressing remains dry & intact, small amount of dried drainage marked. Merino removed at 0600, pt had very small void with 0 PVR, waiting for first full void. Continent of bowel. Pt uses call light appropriately and able to make needs known. No acute changes overnight.     Goal Outcome Evaluation:    Plan of Care Reviewed With: patient    Overall Patient Progress: no change

## 2025-07-01 NOTE — PLAN OF CARE
Physical Therapy: Orders received. Chart reviewed and discussed with care team.? Physical Therapy not indicated due to pt demonstrating independence with mobility.? All of patients rehab needs can be met by OT needs.  Defer discharge recommendations to OT.? Will complete orders.

## 2025-07-01 NOTE — PROGRESS NOTES
[facility]    Medicine Progress Note - Hospitalist Service, GOLD TEAM 18    Date of Admission:  6/30/2025    Assessment & Plan   70 year old male admitted on 6/30/2025.  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  Admitted by orthopedics for cervical spine fusion.  Medicine consulted for medical co-management.     Today's updates  -Patient was very pleasant this morning, no specific complaints, pain was well-controlled.  All other review of systems negative.      #Cervical radiculopathy s/p posterior spinal fusion 3-4 with right foraminectomy/facetectomy  Spine team is primary  Weight bearing status: WBAT.  Pain management:   - Transition from PCA/i.v. to PO narcotics as tolerated. No NSAIDS for 3 months.   Antibiotics: Ancef x 48 hours, or until the drain is in- whichever is earlier.  Diet: As tolerated  DVT prophylaxis: SCDs only. No chemical DVT ppx needed.  Imaging: XR C-spine AP and lateral today  Bracing/Splinting: Soft collar for comforts  Dressings: Can remove dressing on POD-2. Please clean the incision with betadine swabs.   Drains: Potential removal today vs tomorrow  Merino catheter: Remove  Physical Therapy/Occupational Therapy: Eval and treat today.     #Yellow nail syndrome  #Chronic cough  #Pseudomonas colonization  #Recurrent pleural effusions s/p mechanical pleurodesis  # Phrenic nerve palsy, hemidiaphragm paralysis  Follows with [facility] pulmonology last seen 6/2/2025.  Long history of suspected yellow nail syndrome (although patient notes that [facility] believes he does not have this diagnosis) Additionally had recurrent pleural effusions  that have since resolved since pleurodesis. Regarding yellow nail syndrome, no longer on scheduled inhalers.  Suspected Pseudomonas colonization, typically performs tobramycin inhalers 30 days on, 30 days off.  Currently off cycle.  Additionally uses hyper tonic saline nebs while not using tobramycin and gentamicin nasal suspension, both of which he states he does not need in the hospital.  Denies current shortness of breath.  Currently hemodynamically.   -Isolation precautions  - PTA supplemental oxygen, 2 L overnight  - As needed albuterol inhaler  - Continuous pulse oximetry  - Could consider infectious disease consult in postoperative setting for any additional concerns     #Hypertension  - Blood pressure stable today  - PTA losartan  - PTA triamterene-hydrochlorothiazide  - PTA diltiazem     #Hyperlipidemia  #CAD  - PTA rosuvastatin     #Hypothyroidism  Last TSH 2.12 11/2024  - Continue PTA Synthroid     #ADD  #Anxiety  #Depression  #Insomnia  - PTA Wellbutrin, BuSpar, lamotrigine, memantine, Viibryd          Diet: Advance Diet as Tolerated: Regular Diet Adult    DVT Prophylaxis: Defer to primary service  Merino Catheter: Not present  Lines: None     Cardiac Monitoring: None  Code Status: Full Code      Clinically Significant Risk Factors                                         Social Drivers of Health    Tobacco Use: Medium Risk (6/12/2025)    Patient History     Smoking Tobacco Use: Former     Smokeless Tobacco Use: Never     Passive Exposure: Past   Physical Activity: Insufficiently Active (5/1/2024)    Received from [facility]    Exercise Vital Sign     Days of Exercise per Week: 3 days     Minutes of Exercise per Session: 20 min   Social Connections: Unknown (1/16/2023)    Received from [facility]    Social Connection and Isolation Panel [NHANES]     Frequency of Communication with Friends and Family: More than three times a week     Frequency of Social Gatherings with Friends and Family: More than three  times a week     Attends [employer] Services: Patient declined     Active Member of Clubs or Organizations: Patient declined     Attends Club or Organization Meetings: More than 4 times per year     Marital Status:           Disposition Plan     Medically Ready for Discharge: Anticipated in 2-4 Days             Herminio Eduardo MD  Hospitalist Service, GOLD TEAM 18  [facility]  Securely message with [employer] (more info)  Text page via [facility] Paging/Directory   See signed in provider for up to date coverage information  ______________________________________________________________________    Interval History     No acute events overnight.  Patient was pleasant.  Patient denies any shortness of breath, cough, fever, chills, chest pain, palpitations, abdominal pain, nausea, vomit, abdominal pain or dysuria.    Physical Exam   Vital Signs: Temp: 98.2  F (36.8  C) Temp src: Oral BP: (!) 142/93 Pulse: 69   Resp: 18 SpO2: 95 % O2 Device: None (Room air) Oxygen Delivery: 3 LPM  Weight: 181 lbs 3.49 oz    General: Appears stated age, no acute distress  Head/neck: Cervical collar in place  Cardiovascular: S1/S2, Normal rate and rhythm   Respiratory: Normal respiratory effort, lung fields clear to auscultation  Musculoskeletal: extremities normal- no gross deformities noted and normal muscle tone  Neuropsych: Speaks clearly, answers questions appropriately    Medical Decision Making       51 MINUTES SPENT BY ME on the date of service doing chart review, history, exam, documentation & further activities per the note.      Data     I have personally reviewed the following data over the past 24 hrs:    9.2  \   11.6 (L)   / 241     137 102 21.2 /  131 (H)   3.9 25 0.83 \       Imaging results reviewed over the past 24 hrs:   Recent Results (from the past 24 hours)   XR Cervical Spine 2/3 Views    Narrative    XR CERVICAL SPINE 2/3 VIEWS 7/1/2025 12:59 PM    History: post op    ICD-10:    Comparison: CT cervical spine 3/11/2025.    Findings: AP and lateral views of the cervical spine were obtained.  New changes of posterior fusion instrumentation at C3-4 including a  posterior surgical drain. Trace retrolisthesis at C4-5, unchanged.  Multilevel loss of intervertebral disc height, greatest at C4-5.  Multilevel endplate osteophytosis, uncinate hypertrophy, and facet  hypertrophy. No prevertebral edema. Bilateral carotid atherosclerotic  calcification. No mass in the visualized lung apices.      Impression    Impression:  1. New changes of posterior fusion instrumentation at C3-4.  2. Multilevel cervical degenerative changes, most pronounced at C4-5.    AKOSUA ARROYO MD         SYSTEM ID:  [id number]

## 2025-07-01 NOTE — PROGRESS NOTES
VS: VSS AOX4    O2: RA   Output: Continent x2   Activity: indp   Skin: X; integrity; spinal incision; CDI   Pain: Managed with PRN medications   CMS: Denies n/t   Dressing: CDI   Diet: Takes pills whole   LDA: Hemovac  L PIV; SL; CDI   Plan: Awaiting pain management regime.    Additional Info: PT denies chest pain, pain and SOB.     No acute changes during this shift, call light within reach, continue with plan of care.

## 2025-07-01 NOTE — CONSULTS
[facility]    History and Physical - Hospitalist Service       Date of Admission:  6/30/2025    Assessment & Plan      Alton Bronson is a 70 year old male admitted on 6/30/2025.  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  Admitted by orthopedics for cervical spine fusion.  Medicine consulted for medical comanagement.    #Cervical radiculopathy s/p posterior spinal fusion 3-4 with right foraminectomy/facetectomy  Procedure by Dr. Tre Mcduffie.   mL  Orthopedics primary, recs below  Admit to surgical floor.    Continue IV lidocaine until POD#2.    Continue antibiotics x 24 hrs.    Mechanical DVT prophylaxis.    Hospitalist medical co-management.    PT and OT consult.    No lifting more than 10 pounds, avoid excessive bending or twisting.    Soft collar for comfort.  Cervical CT postop.    Cervical ap-lat x-rays prior to discharge.  Anticipate discharge in 1-2 days.    RTC 6 weeks with cervical AP-lat standing x-rays.    - PTA gabapentin    #Yellow nail syndrome  #Chronic cough  #Pseudomonas colonization  #Recurrent pleural effusions s/p mechanical pleurodesis  # Phrenic nerve palsy, hemidiaphragm paralysis  Follows with [facility] pulmonology last seen 6/2/2025.  Long history of suspected yellow nail syndrome (although patient notes that [facility] believes he does not have this diagnosis) Additionally had recurrent pleural effusions that have since resolved since pleurodesis. Regarding yellow nail syndrome, no longer on scheduled inhalers.  Suspected Pseudomonas colonization, typically performs tobramycin inhalers 30 days on, 30 days off.   Currently off cycle.  Additionally uses hyper tonic saline nebs while not using tobramycin and gentamicin nasal suspension, both of which he states he does not need in the hospital.  Denies current shortness of breath.  Currently hemodynamically.   -Isolation precautions  - PTA supplemental oxygen, 2 L overnight  - As needed albuterol inhaler  - Continuous pulse oximetry  - Could consider infectious disease consult in postoperative setting for any additional concerns    #Hypertension  Current blood pressure postoperatively 122/65  -Resume PTA blood pressure medications in a.m. as blood pressures allow  - PTA losartan  - PTA triamterene-hydrochlorothiazide  - PTA diltiazem    #Hyperlipidemia  #CAD  - PTA rosuvastatin    #Hypothyroidism  Last TSH 2.12 11/2024  - Continue PTA Synthroid    #ADD  #Anxiety  #Depression  #Insomnia  - PTA Wellbutrin, BuSpar, lamotrigine, memantine, Viibryd       Diet: Advance Diet as Tolerated: Regular Diet Adult    DVT Prophylaxis: Pneumatic Compression Devices  Merino Catheter: Not present  Lines: None     Cardiac Monitoring: None  Code Status: Full Code      Clinically Significant Risk Factors Present on Admission                 # Drug Induced Platelet Defect: home medication list includes an antiplatelet medication   # Hypertension: Home medication list includes antihypertensive(s)                      Disposition Plan     Medically Ready for Discharge: Ready Now         The patient's care was discussed with the Patient.    Aime Silver PA-C  Hospitalist Service  [facility]  Securely message with [facility] (more info)  Text page via [facility] Paging/Directory     ______________________________________________________________________    Chief Complaint   Medical comanagement    History is obtained from the patient    History of Present Illness   Alton Bronson is a 70 year old male admitted on 6/30/2025.  History notable for yellow nail syndrome  with Pseudomonas colonization of lungs and sinuses, recurrent pleural effusions, right phrenic nerve palsy, s/p right diaphragmatic plication, chronic cough, tobacco use, CAD, hyperlipidemia, lymphopenia, arthritis, gastroparesis, esophageal dysmotility, hiatal hernia, hypothyroidism, ADD, anxiety, depression.  Admitted by orthopedics for cervical spine fusion.  Medicine consulted for medical comanagement.  Saw patient in room.  Per prior arrival medical history.  Noted that [facility] said that he may not have yellow nail syndrome.  No longer does regular inhalers.  Reports no shortness of breath or chest pain.  Still utilizes oxygen overnight.  Patient and writer went through whole med list and patient confirmed current medications.  Stated that he does tobramycin nebs 30 days on, 30 days off.  Typically has hypertonic saline well off, but does not need an hospital.  Additionally utilizes gentamicin nasal solution, also notes that he does not need that in the hospital.  Noted that he had no further recommendations from his pulmonologist prior to procedure.  No other significant concerns at this time    Past Medical History    Past Medical History:   Diagnosis Date    ADD (attention deficit disorder)     Anxiety     Cervical radiculopathy     Depression     Foraminal stenosis of cervical region     Gastroesophageal reflux disease with esophagitis     Gastroparesis     Hypertension     Osteoarthritis     Pleural effusion     Thyroid disease     Uncomplicated asthma        Past Surgical History   Past Surgical History:   Procedure Laterality Date    ARTHRODESIS TOE(S) Right 11/28/2022    Procedure: RIGHT FIRST METATARSOPHALANGEAL JOINT ARTHRODESIS,;  Surgeon: Alhaji Louise MD;  Location:  OR    BUNIONECTOMY Right 11/28/2022    Procedure: RIGHT BUNIONETTE REPAIR;  Surgeon: Alhaji Louise MD;  Location:  OR    GASTRIC FUNDOPLICATION      HERNIA REPAIR      INJECT EPIDURAL TRANSFORAMINAL Right 3/27/2025     Procedure: C4 Selective Nerve Root Block;  Surgeon: Jacek Yoon MD;  Location: [facility] OR    IR PICC PLACEMENT > 5 YRS OF AGE  3/20/2024    IR PICC PLACEMENT > 5 YRS OF AGE  3/22/2024    IR PICC PLACEMENT > 5 YRS OF AGE  3/25/2024    IR THORACENTESIS  10/10/2023    IR THORACENTESIS  9/26/2023    IR THORACENTESIS  9/18/2023    IR THORACENTESIS  9/6/2023    IR THORACENTESIS  8/29/2023    IR THORACENTESIS  8/18/2023    IR THORACENTESIS  8/7/2023    IR THORACENTESIS  7/27/2023    IR THORACENTESIS  7/17/2023    IR THORACENTESIS  1/12/2023    IR THORACENTESIS  12/29/2022    IR THORACENTESIS  12/23/2022    IR THORACENTESIS  12/16/2022    IR THORACENTESIS  10/18/2022    IR THORACENTESIS  10/10/2022    IR THORACENTESIS  10/3/2022    IR THORACENTESIS  9/27/2022    IR THORACENTESIS  9/14/2022    IR THORACENTESIS  9/1/2022    IR THORACENTESIS  8/29/2022    SEPTOPLASTY      TONSILLECTOMY         Prior to Admission Medications   Prior to Admission Medications   Prescriptions Last Dose Informant Patient Reported? Taking?   Multiple Vitamin (MULTIVITAMIN ADULT PO) Past Month  Yes Yes   Sig: Take by mouth every morning.   Probiotic Product (ALIGN) CAPS Past Week  Yes Yes   Sig: Take 1 tablet by mouth every morning.   TIADYLT  MG 24 hr ER beaded capsule   Yes No   Sig: Take 240 mg by mouth daily.   acetaminophen (TYLENOL) 650 MG CR tablet 6/29/2025  Yes Yes   Sig: Take 1,300 mg by mouth 2 times daily.   albuterol (PROAIR HFA/PROVENTIL HFA/VENTOLIN HFA) 108 (90 Base) MCG/ACT inhaler Past Week  Yes Yes   Sig: Inhale 2 puffs into the lungs every 4 hours as needed.   buPROPion (WELLBUTRIN XL) 150 MG 24 hr tablet Past Week  Yes Yes   Sig: Take 150 mg by mouth every morning.   busPIRone (BUSPAR) 10 MG tablet   Yes No   Sig: Take 15 mg by mouth 2 times daily.   famotidine (PEPCID) 20 MG tablet 6/30/2025 at  4:30 AM  Yes Yes   Sig: Take 20 mg by mouth 2 times daily   fish oil-omega-3 fatty acids 1000 MG capsule Past Month  Yes Yes    Sig: Take 2 g by mouth daily.   gabapentin (NEURONTIN) 300 MG capsule 6/29/2025 at  9:00 PM  Yes Yes   Sig: Take 1,500 mg by mouth at bedtime.   gentamicin sulfate POWD Past Week  Yes Yes   Sig: Spray in nostril 2 times daily.   hydrALAZINE (APRESOLINE) 10 MG tablet More than a month  Yes Yes   Sig: Take 0.5 tablets by mouth 2 times daily.   ibuprofen (ADVIL/MOTRIN) 800 MG tablet Past Month  No Yes   Sig: Take 1 tablet (800 mg) by mouth every 8 hours as needed for moderate pain (4-6)   Patient taking differently: Take 400 mg by mouth 2 times daily.   lamoTRIgine (LAMICTAL) 100 MG tablet 6/29/2025 at  9:00 PM  Yes Yes   Sig: Take 150 mg by mouth at bedtime.   levothyroxine (SYNTHROID/LEVOTHROID) 137 MCG tablet 6/30/2025 at  4:30 AM  Yes Yes   Sig: Take 1 tablet by mouth every morning.   losartan (COZAAR) 25 MG tablet 6/30/2025 at  4:30 AM  Yes Yes   Sig: Take 1 tablet by mouth every morning.   melatonin 5 MG tablet Past Week  Yes Yes   Sig: Take 5 mg by mouth at bedtime.   memantine (NAMENDA) 28 x 5 MG & 21 x 10 MG tablet 6/30/2025 at  4:30 AM  Yes Yes   Sig: Take 1 tablet by mouth 2 times daily.   methocarbamol (ROBAXIN) 500 MG tablet 6/29/2025 at 12:00 PM  No Yes   Sig: Take 1 tablet (500 mg) by mouth 3 times daily.   omeprazole (PRILOSEC) 20 MG DR capsule 6/30/2025 at  4:30 AM  Yes Yes   Sig: Take 20 mg by mouth 2 times daily.   rosuvastatin (CRESTOR) 5 MG tablet Past Week  Yes Yes   Sig: Take 1 tablet by mouth every morning.   sildenafil (REVATIO) 20 MG tablet More than a month  Yes Yes   Sig: Take 1-5 tablets 1 hour before SA, take on an empty stomach.   sodium chloride (NEBUSAL) 3 % neb solution Past Week  Yes Yes   Sig: Take 3 mLs by nebulization 2 times daily.   study - aspirin, IDS# [id number], 81 mg tablet Past Month  Yes Yes   Sig: Take 81 mg by mouth every morning.   tobramycin, PF, (AMERICA) 300 MG/5ML neb solution Past Month  Yes Yes   Sig: Inhale 300 mg into the lungs two times daily.   traZODone (DESYREL) 50  MG tablet Past Month  Yes Yes   Sig: Take 50 mg by mouth at bedtime.   triamterene-HCTZ (DYAZIDE) 37.5-25 MG capsule 6/30/2025 at  4:30 AM  Yes Yes   Sig: Take 1 capsule by mouth every morning.   vilazodone (VIIBRYD) 40 MG TABS tablet Past Week  Yes Yes   Sig: Take 40 mg by mouth every morning.   vitamin D3 (CHOLECALCIFEROL) 125 MCG (5000 UT) tablet Past Month  Yes Yes   Sig: every morning.      Facility-Administered Medications: None        Physical Exam   Vital Signs: Temp: 98  F (36.7  C) Temp src: Oral BP: 122/65 Pulse: 83   Resp: 14 SpO2: 94 % O2 Device: Nasal cannula Oxygen Delivery: 3 LPM  Weight: 181 lbs 3.49 oz    Exam:  General: Appears stated age, no acute distress  Head/neck: Cervical collar in place  Cardiovascular: S1/S2, Normal rate and rhythm   Respiratory: Normal respiratory effort, lung fields clear to auscultation  Musculoskeletal: extremities normal- no gross deformities noted and normal muscle tone  Neuropsych: Speaks clearly, answers questions appropriately    Medical Decision Making       55 MINUTES SPENT BY ME on the date of service doing chart review, history, exam, documentation & further activities per the note.      Data   ------------------------- PAST 24 HR DATA REVIEWED -----------------------------------------------

## 2025-07-01 NOTE — PROGRESS NOTES
"Orthopaedic Surgery Progress Note:       Subjective:   Reported of pain near the surgical site. Overall, reports the neck pain is better and different from the pre op pain. Pain controlled on current regimen. Tolerating solids. No N/V. Denies CP/SOB/F.     Objective:   BP 94/61 (BP Location: Right arm)   Pulse 87   Temp 98.4  F (36.9  C) (Oral)   Resp 22   Ht 1.854 m (6' 1\")   Wt 82.2 kg (181 lb 3.5 oz)   SpO2 93%   BMI 23.91 kg/m    No intake/output data recorded.  Gen: NAD. Resting comfortably in bed  Resp: Breathing comfortably on RA  : Merino in place    Dressings clean and dry  Drain in place and maintaining suction     Cervical spine:    Appearance -no gross step-offs, kyphosis.    Motor -     C5: Deltoids R 5/5 and L 5/5 strength    C6: Biceps R 5/5 and L 5/5 strength     C7: Triceps R 5/5 and L 5/5 strength     C8:  R 5/5 and L 5/5 strength     T1: Dorsal interossei R 5/5 and L 5/5 strength        Sensation: intact to light touch in C5-T1        Labs:  Lab Results   Component Value Date    WBC 9.2 07/01/2025    HGB 11.6 (L) 07/01/2025     07/01/2025        Assessment & Plan:   Assessment and Plan: Yves Wolff is a 70/M s/p C3-C4 posterior cervical fusion with right foraminotomy.     Ortho (Dr. Lucas) Primary  Activity:   - Up with assist. No excessive bending or twisting. No lifting >10 lbs x 6 weeks.      Weight bearing status: WBAT.  Pain management:   - Transition from PCA/i.v. to PO narcotics as tolerated. No NSAIDS for 3 months.   Antibiotics: Ancef x 48 hours, or until the drain is in- whichever is earlier.  Diet: As tolerated  DVT prophylaxis: SCDs only. No chemical DVT ppx needed.  Imaging: XR C-spine AP and lateral today  Bracing/Splinting: Soft collar for comforts  Dressings: Can remove dressing on POD-2. Please clean the incision with betadine swabs.   Drains: Potential removal today vs tomorrow  Merino catheter: Remove  Physical Therapy/Occupational Therapy: Eval and treat " today.  Follow-up: Clinic with Dr. Lucas in 6 weeks with repeat x-rays.   Disposition: Pending progress with therapies, pain control on orals, and medical stability, anticipate discharge later today or tomorrow.     Brooks Bansal  Spine Fellow, PGY5 Neurosurgery

## 2025-07-01 NOTE — PROGRESS NOTES
"   07/01/25 0800   Appointment Info   Signing Clinician's Name / Credentials (OT) Korin Thomas, OTR/L   Living Environment   People in Home spouse   Current Living Arrangements house   Home Accessibility stairs to enter home   Number of Stairs, Main Entrance 2   Living Environment Comments Pt reported he lives with his wife in a 1 level home, 2 EUGENIO, all needs on main level, walk in shower, no shower chair or grab bars, standard height toilet   Self-Care   Usual Activity Tolerance good   Current Activity Tolerance moderate   Equipment Currently Used at Home none   Fall history within last six months no   Activity/Exercise/Self-Care Comment IND with ADLs at baseline   Instrumental Activities of Daily Living (IADL)   IADL Comments IND with IADLs at baseline, drives, retired   General Information   Onset of Illness/Injury or Date of Surgery 06/30/25   Referring Physician Lul Kim MD   Patient/Family Therapy Goal Statement (OT) to return home   Additional Occupational Profile Info/Pertinent History of Current Problem Per chart, \"Yves Wolff is a 70/M s/p C3-C4 posterior cervical fusion with right foraminotomy.\"   Existing Precautions/Restrictions spinal  (cervical spine)   Left Upper Extremity (Weight-bearing Status) partial weight-bearing (PWB)  (<10#)   Right Upper Extremity (Weight-bearing Status) partial weight-bearing (PWB)  (<10#)   Cognitive Status Examination   Orientation Status orientation to person, place and time   Affect/Mental Status (Cognitive) WNL   Follows Commands WNL   Cognitive Status Comments Appears at baseline   Visual Perception   Visual Impairment/Limitations WFL   Sensory   Sensory Quick Adds sensation intact   Pain Assessment   Patient Currently in Pain Yes, see Vital Sign flowsheet   Posture   Posture not impaired   Range of Motion Comprehensive   General Range of Motion bilateral upper extremity ROM WFL   Strength Comprehensive (MMT)   Comment, General Manual Muscle Testing " (MMT) Assessment BUE strength WFL   Coordination   Upper Extremity Coordination No deficits were identified   Bed Mobility   Bed Mobility supine-sit;sit-supine   Supine-Sit [place] (Bed Mobility) supervision   Sit-Supine [place] (Bed Mobility) supervision   Transfers   Transfers sit-stand transfer   Sit-Stand Transfer   Sit-Stand [place] (Transfers) supervision   Balance   Balance Assessment no deficits identified   Activities of Daily Living   BADL Assessment/Intervention bathing;upper body dressing;lower body dressing;grooming;toileting   Bathing Assessment/Intervention   [place] Level (Bathing) verbal cues   Comment, (Bathing) Per clinical judgement   Upper Body Dressing Assessment/Training   [place] Level (Upper Body Dressing) set up   Comment, (Upper Body Dressing) Per clinical judgement   Lower Body Dressing Assessment/Training   [place] Level (Lower Body Dressing) supervision   Grooming Assessment/Training   [place] Level (Grooming) supervision   Toileting   [place] Level (Toileting) supervision   Clinical Impression   Criteria for Skilled Therapeutic Interventions Met (OT) Yes, treatment indicated   OT Diagnosis decreased ADL and IADL IND   OT Problem List-Impairments impacting ADL problems related to;pain;post-surgical precautions   Assessment of Occupational Performance 1-3 Performance Deficits   Identified Performance Deficits ADL including bathing, IADLs   Planned Therapy Interventions (OT) ADL retraining;IADL retraining;transfer training;home program guidelines;progressive activity/exercise   Clinical Decision Making Complexity (OT) problem focused assessment/low complexity   Risk & Benefits of therapy have been explained evaluation/treatment results reviewed;care plan/treatment goals reviewed;risks/benefits reviewed;current/potential barriers reviewed;participants voiced agreement with care plan;participants included;patient   Clinical Impression Comments Pt  presents slightly below functional baseline, limited by post op precautions and pain. Pt would benefit from 1 time OT treatment to progress ADL IND and safety within precautions and facilitate safe discharge planning.   OT Total Evaluation Time   OT Eval, Low Complexity Minutes (38926) 5   OT Goals   Therapy Frequency (OT) One time eval and treatment   OT Predicted Duration/Target Date for Goal Attainment 07/02/25   OT Goals Hygiene/Grooming;Toilet Transfer/Toileting;Transfers;Bed Mobility;Lower Body Dressing;OT Goal 1   OT: Hygiene/Grooming modified independent;within precautions;Goal Met   OT: Lower Body Dressing Modified independent;within precautions;Goal Met   OT: Bed Mobility Modified independent;within precautions;Goal Met   OT: Transfer Modified independent;within precautions;Goal Met   OT: Toilet Transfer/Toileting Modified independent;within precautions;Goal Met   OT: Goal 1 Pt will complete 2 stairs SBA by discharge.  (Goal met)   Self-Care/Home Management   Self-Care/Home Mgmt/ADL, Compensatory, Meal Prep Minutes (14620) 10   Symptoms Noted During/After Treatment (Meal Preparation/Planning Training) increased pain   Treatment Detail/Skilled Intervention OT: Facilitated ADL for increased IND within precautions. Pt educated on c-spine precautions and soft collar for comfort, pt demo'd good understanding. Pt with relevant questions, answered within scope and deferred rest to ortho spine. Pt sat EoB SBA, verbal cues for precautions. Pt progressed to mod IND. Pt stood SBA, walked in room SBA and progressed to IND. Pt completed toileting and g/h simulated initially SBA, progressed to IND. Discussed considerations for avoiding neck flexion and extension with  brushing teeth and washing hair, pt demo'd good understanding. Educated on recommendation to not drive for safety, pt demo'd good understanding. Pt completed all OT goals appropriate for discharge. Pt returned to supine mod IND, left with call light within  reach, all needs met.   Therapeutic Activities   Therapeutic Activity Minutes (04292) 9   Symptoms noted during/after treatment none   Treatment Detail/Skilled Intervention OT: Facilitated functional mobility and stairs for increased IND with household mobility during standing and ambulatory ADL and IADL. Pt walked in hallway SBA, progressed to IND. Pt completed 3 stairs SBA. Pt educated on recommendation to walk in hallways 3-4x/day while inpatient to prevent deconditioning, pt in agreement.   OT Discharge Planning   OT Plan dc   OT Discharge Recommendation (DC Rec) home with assist   OT Rationale for DC Rec Pt presents slightly below functional baseline, limited by post op precautions and pain. Has completed all OT goals for discharge. Recommend discharge home with assist for IADL.   OT Brief overview of current status SBA-IND   Total Session Time   Timed Code Treatment Minutes 19   Total Session Time (sum of timed and untimed services) 24     Occupational Therapy Discharge Summary    Reason for therapy discharge:    All goals and outcomes met, no further needs identified.    Progress towards therapy goal(s). See goals on Care Plan in [facility] electronic health record for goal details.  Goals met    Therapy recommendation(s):    Outpatient PT pending recommendations from ortho after follow up.

## 2025-07-02 VITALS
HEART RATE: 77 BPM | HEIGHT: 73 IN | BODY MASS INDEX: 24.02 KG/M2 | TEMPERATURE: 96.8 F | RESPIRATION RATE: 18 BRPM | SYSTOLIC BLOOD PRESSURE: 142 MMHG | DIASTOLIC BLOOD PRESSURE: 88 MMHG | OXYGEN SATURATION: 93 % | WEIGHT: 181.22 LBS

## 2025-07-02 LAB — GLUCOSE BLDC GLUCOMTR-MCNC: 81 MG/DL (ref 70–99)

## 2025-07-02 PROCEDURE — 250N000013 HC RX MED GY IP 250 OP 250 PS 637

## 2025-07-02 PROCEDURE — 250N000013 HC RX MED GY IP 250 OP 250 PS 637: Performed by: ORTHOPAEDIC SURGERY

## 2025-07-02 PROCEDURE — 250N000011 HC RX IP 250 OP 636

## 2025-07-02 PROCEDURE — 250N000013 HC RX MED GY IP 250 OP 250 PS 637: Performed by: NURSE PRACTITIONER

## 2025-07-02 RX ORDER — METHOCARBAMOL 500 MG/1
500 TABLET, FILM COATED ORAL EVERY 6 HOURS PRN
Qty: 60 TABLET | Refills: 0 | Status: SHIPPED | OUTPATIENT
Start: 2025-07-02

## 2025-07-02 RX ORDER — POLYETHYLENE GLYCOL 3350 17 G/17G
17 POWDER, FOR SOLUTION ORAL DAILY
Qty: 510 G | Refills: 0 | Status: SHIPPED | OUTPATIENT
Start: 2025-07-02

## 2025-07-02 RX ORDER — HYDROMORPHONE HYDROCHLORIDE 2 MG/1
2-4 TABLET ORAL EVERY 4 HOURS PRN
Qty: 35 TABLET | Refills: 0 | Status: SHIPPED | OUTPATIENT
Start: 2025-07-02

## 2025-07-02 RX ORDER — AMOXICILLIN 250 MG
1 CAPSULE ORAL 2 TIMES DAILY
Qty: 30 TABLET | Refills: 0 | Status: SHIPPED | OUTPATIENT
Start: 2025-07-02

## 2025-07-02 RX ADMIN — METHOCARBAMOL 750 MG: 750 TABLET ORAL at 01:18

## 2025-07-02 RX ADMIN — LEVOTHYROXINE SODIUM 175 MCG: 0.07 TABLET ORAL at 09:03

## 2025-07-02 RX ADMIN — HYDROMORPHONE HYDROCHLORIDE 4 MG: 4 TABLET ORAL at 10:37

## 2025-07-02 RX ADMIN — BUSPIRONE HYDROCHLORIDE 15 MG: 15 TABLET ORAL at 09:04

## 2025-07-02 RX ADMIN — VILAZODONE HYDROCHLORIDE 40 MG: 40 TABLET ORAL at 09:04

## 2025-07-02 RX ADMIN — BUPROPION HYDROCHLORIDE 75 MG: 75 TABLET, FILM COATED ORAL at 09:04

## 2025-07-02 RX ADMIN — KETOROLAC TROMETHAMINE 15 MG: 15 INJECTION, SOLUTION INTRAMUSCULAR; INTRAVENOUS at 01:12

## 2025-07-02 RX ADMIN — MEMANTINE 5 MG: 5 TABLET ORAL at 09:05

## 2025-07-02 RX ADMIN — POLYETHYLENE GLYCOL 3350 17 G: 17 POWDER, FOR SOLUTION ORAL at 09:03

## 2025-07-02 RX ADMIN — SENNOSIDES AND DOCUSATE SODIUM 1 TABLET: 50; 8.6 TABLET ORAL at 09:04

## 2025-07-02 RX ADMIN — ACETAMINOPHEN 975 MG: 325 TABLET ORAL at 06:20

## 2025-07-02 RX ADMIN — FAMOTIDINE 20 MG: 20 TABLET, FILM COATED ORAL at 09:04

## 2025-07-02 RX ADMIN — METHOCARBAMOL 750 MG: 750 TABLET ORAL at 09:09

## 2025-07-02 RX ADMIN — HYDROMORPHONE HYDROCHLORIDE 4 MG: 4 TABLET ORAL at 06:20

## 2025-07-02 RX ADMIN — TRIAMTERENE AND HYDROCHLOROTHIAZIDE 1 CAPSULE: 37.5; 25 CAPSULE ORAL at 09:04

## 2025-07-02 RX ADMIN — DILTIAZEM HYDROCHLORIDE 240 MG: 120 CAPSULE, COATED, EXTENDED RELEASE ORAL at 09:04

## 2025-07-02 RX ADMIN — PANTOPRAZOLE SODIUM 40 MG: 40 TABLET, DELAYED RELEASE ORAL at 09:04

## 2025-07-02 RX ADMIN — ROSUVASTATIN CALCIUM 5 MG: 5 TABLET, FILM COATED ORAL at 09:04

## 2025-07-02 ASSESSMENT — ACTIVITIES OF DAILY LIVING (ADL)
ADLS_ACUITY_SCORE: 26
ADLS_ACUITY_SCORE: 28
ADLS_ACUITY_SCORE: 20
ADLS_ACUITY_SCORE: 28
ADLS_ACUITY_SCORE: 28
ADLS_ACUITY_SCORE: 20
ADLS_ACUITY_SCORE: 28
ADLS_ACUITY_SCORE: 20
ADLS_ACUITY_SCORE: 28
ADLS_ACUITY_SCORE: 26

## 2025-07-02 NOTE — PLAN OF CARE
Goal Outcome Evaluation:    4564 -2504 Shift      Overall Patient Progress: improving    Pt A & O X 4. Up independently in room without a device.. Sleeping well most noc after getting Toradol and prn Robaxin. On 2L of O2 per NC. Uses urinal independently at bedside. Hemovac in place with bright red bloody drainage. Pleasant/cooperative/able to make needs known. Nursing is monitoring and assisting as needed      Lanette Reyes RN

## 2025-07-02 NOTE — PROGRESS NOTES
"Orthopaedic Surgery Progress Note:       Subjective:   Did well last night compared to night before. Pain gradually improving. Still reports some stiffness around the incision site. Happy with his progress.. Expressed the desire to be discharged today.     Objective:   /52 (BP Location: Right arm, Patient Position: Left side, Cuff Size: Adult Regular)   Pulse 63   Temp 98.5  F (36.9  C) (Oral)   Resp 17   Ht 1.854 m (6' 1\")   Wt 82.2 kg (181 lb 3.5 oz)   SpO2 (!) 91%   BMI 23.91 kg/m    I/O this shift:  In: -   Out: 55 [Drains:55]  Gen: NAD. Resting comfortably in bed  Resp: Breathing comfortably on RA  : Merino in place    Dressings clean and dry  Drain in place and maintaining suction     Cervical spine:    Appearance -no gross step-offs, kyphosis.    Motor -     C5: Deltoids R 5/5 and L 5/5 strength    C6: Biceps R 5/5 and L 5/5 strength     C7: Triceps R 5/5 and L 5/5 strength     C8:  R 5/5 and L 5/5 strength     T1: Dorsal interossei R 5/5 and L 5/5 strength        Sensation: intact to light touch in C5-T1        Labs:  Lab Results   Component Value Date    WBC 9.2 07/01/2025    HGB 11.6 (L) 07/01/2025     07/01/2025        Assessment & Plan:   Assessment and Plan: Yves Wolff is a 70/M s/p C3-C4 posterior cervical fusion with right foraminotomy.     Ortho (Dr. Lucas) Primary  Activity:   - Up with assist. No excessive bending or twisting. No lifting >10 lbs x 6 weeks.      Weight bearing status: WBAT.  Pain management:   - Continue PO narcotics, tylenol, muscle relaxants PRN as tolerated. No NSAIDS for 3 months.   Antibiotics: Ancef x 48 hours, or until the drain is in- whichever is earlier.  Diet: As tolerated  DVT prophylaxis: SCDs only. No chemical DVT ppx needed.  Imaging: XR C-spine AP and lateral today  Bracing/Splinting: Soft collar for comforts  Dressings: Can remove dressing today. Please clean the incision with betadine swabs.   Drains: Potential removal today   Merino " catheter: Remove  Physical Therapy/Occupational Therapy: Eval and treat today.  Follow-up: Clinic with Dr. Lucas in 6 weeks with repeat x-rays.   Disposition: Pending progress with therapies, pain control on orals, and medical stability, anticipate discharge later today .     Brooks Bansal  Spine Fellow, PGY5 Neurosurgery

## 2025-07-02 NOTE — PLAN OF CARE
Goal Outcome Evaluation:      Plan of Care Reviewed With: patient        Pt alert and oriented x4, pain managed with dilaudid and tylenol on this shift, requested for Toradol, paged ortho new orders in, Pt passing gas last BM 2 days ago. Soft collar on for comfort, on 2 L 02 NC, able to make needs known, would like to be woken for pain meds. Has hemovac in place. Plan for potential discharge tomorrow. Continue POC       @IPHNDFIELD(1)@

## 2025-07-02 NOTE — DISCHARGE SUMMARY
ORTHOPAEDIC SURGERY DISCHARGE SUMMARY     Date of Admission: 6/30/2025  Date of Discharge: 7/2/2025 12:24 PM  Disposition: Home  Staff Physician: No att. providers found  Primary Care Provider: Alhaji López    DISCHARGE DIAGNOSIS:  Foraminal stenosis of cervical region [M48.02]  Cervical spondylosis without myelopathy [M47.812]  Cervical radiculopathy [M54.12]    PROCEDURES: Procedure(s):  Posterior instrumented spinal fusion cervical 3-4 with right foraminotomy/facetectomy; use of allograft.  on 6/30/2025    BRIEF HISTORY:  This is a 70 year old year old patient who has been followed in clinic. Please refer to that documentation for full details. Briefly, the patient has a history of cervical stenosis. The patient has failed conservative treatment of symptoms and desires more definitive intervention. Therefore, after reviewing non-operative and operative options including the risks and benefits associated with each, the patient elected to proceed with the above stated procedure.     HOSPITAL COURSE:    The patient was admitted following the above listed procedures for pain control and rehabilitation. Alton Bronson did well post-operatively. Medicine was consulted post operatively to aid in management of medical co-morbidities. The patient received routine nursing cares and at the time of discharge was medically stable. Vital signs were stable throughout admission. The patient is tolerating a regular diet and is voiding spontaneously. All PT/OT goals have been met for safe mobility. Pain is now controlled on oral medications which will be available on discharge. Stool softeners have been used while taking pain medications to help prevent constipation. Alton Bronson is deemed medically safe to discharge.     Antibiotics:  Ancef given periop and 24 hours postop.  DVT prophylaxis:  Mechanical  PT Progress:  Has met PT/OT goals for safe mobility.    Pain Meds:  Weaned off all IV pain meds by  discharge.  Inpatient Events: No significant events or complications.     PHYSICAL EXAM:    Gen: NAD. Resting comfortably in bed  Resp: Breathing comfortably on RA  : Merino in place     Dressings clean and dry  Drain in place and maintaining suction      Cervical spine:                          Appearance -no gross step-offs, kyphosis.                          Motor -                           C5: Deltoids R 5/5 and L 5/5 strength                          C6: Biceps R 5/5 and L 5/5 strength                           C7: Triceps R 5/5 and L 5/5 strength                           C8:  R 5/5 and L 5/5 strength                           T1: Dorsal interossei R 5/5 and L 5/5 strength                               Sensation: intact to light touch in C5-T1    FOLLOWUP:    Follow up with Dr. Lucas at 6 weeks postoperatively.    Future Appointments   Date Time Provider Department Center   8/14/2025  8:40 AM Tre Lucas MD [facility]       Orthopaedic Surgery appointments are at the [facility] ([address] [place] [address]). Call [phone] to schedule a follow-up appointment at this location with your provider.     PLANNED DISCHARGE ORDERS:     DVT Prophylaxis: Mechanical     Activity: WBAT      Wound Care: see Below      Discharge Medication List as of 7/2/2025 10:55 AM        START taking these medications    Details   HYDROmorphone (DILAUDID) 2 MG tablet Take 1-2 tablets (2-4 mg) by mouth every 4 hours as needed for moderate to severe pain., Disp-35 tablet, R-0, E-Prescribe      polyethylene glycol (MIRALAX) 17 GM/Dose powder Take 17 g by mouth daily. For constipation, hold for loose stools, Disp-510 g, R-0, E-Prescribe      senna-docusate (SENOKOT-S/PERICOLACE) 8.6-50 MG tablet Take 1 tablet by mouth 2 times daily. For constipation, hold for loose stools, Disp-30 tablet, R-0, E-Prescribe           CONTINUE these medications which have CHANGED    Details    methocarbamol (ROBAXIN) 500 MG tablet Take 1 tablet (500 mg) by mouth every 6 hours as needed for muscle spasms., Disp-60 tablet, R-0, E-Prescribe           CONTINUE these medications which have NOT CHANGED    Details   acetaminophen (TYLENOL) 650 MG CR tablet Take 1,300 mg by mouth 2 times daily., Historical      albuterol (PROAIR HFA/PROVENTIL HFA/VENTOLIN HFA) 108 (90 Base) MCG/ACT inhaler Inhale 2 puffs into the lungs every 4 hours as needed., Historical      buPROPion (WELLBUTRIN) 75 MG tablet Take 1 tablet by mouth daily., Historical      busPIRone (BUSPAR) 10 MG tablet Take 15 mg by mouth 2 times daily., Historical      famotidine (PEPCID) 20 MG tablet Take 20 mg by mouth 2 times daily, Historical      fish oil-omega-3 fatty acids 1000 MG capsule Take 2 g by mouth daily., Historical      gabapentin (NEURONTIN) 300 MG capsule Take 1,500 mg by mouth at bedtime., Historical      gentamicin sulfate POWD Spray in nostril 2 times daily., Historical      lamoTRIgine (LAMICTAL) 100 MG tablet Take 150 mg by mouth at bedtime., Historical      levothyroxine (SYNTHROID/LEVOTHROID) 175 MCG tablet Take 1 tablet by mouth daily at 2 pm., Historical      losartan (COZAAR) 100 MG tablet Take 1 tablet by mouth daily at 2 pm., Historical      melatonin 5 MG tablet Take 5 mg by mouth at bedtime., Historical      memantine (NAMENDA) 5 MG tablet Take 5 mg by mouth 2 times daily., Historical      Multiple Vitamin (MULTIVITAMIN ADULT PO) Take by mouth every morning., Historical      omeprazole (PRILOSEC) 20 MG DR capsule Take 20 mg by mouth 2 times daily., Historical      Probiotic Product (ALIGN) CAPS Take 1 tablet by mouth every morning., Historical      rosuvastatin (CRESTOR) 5 MG tablet Take 1 tablet by mouth every morning., Historical      sildenafil (REVATIO) 20 MG tablet Take 1-5 tablets 1 hour before SA, take on an empty stomach., Historical      sodium chloride (NEBUSAL) 3 % neb solution Take 3 mLs by nebulization 2 times  daily., Historical      study - aspirin, IDS# [id number], 81 mg tablet Take 81 mg by mouth every morning., Historical      TIADYLT  MG 24 hr ER beaded capsule Take 240 mg by mouth daily., PUMA, Historical      tobramycin, PF, (AMERICA) 300 MG/5ML neb solution Inhale 300 mg into the lungs two times daily., Historical      traZODone (DESYREL) 50 MG tablet Take 25 mg by mouth at bedtime., Historical      triamterene-HCTZ (DYAZIDE) 37.5-25 MG capsule Take 1 capsule by mouth every morning., Historical      vilazodone (VIIBRYD) 40 MG TABS tablet Take 40 mg by mouth every morning., Historical      vitamin D3 (CHOLECALCIFEROL) 125 MCG (5000 UT) tablet every morning., Historical           STOP taking these medications       ibuprofen (ADVIL/MOTRIN) 800 MG tablet Comments:   Reason for Stopping:                 Discharge Procedure Orders   Reason for your hospital stay   Order Comments: Cervical radiculopathy     Brief Discharge Instructions   Order Comments: Wound Care    You may leave the dry dressing in place over your incision for 7 days after surgery. After this, the dressing can be removed and the wound can be left open to air. Your incision is closed with skin glue (exofin) or steri strips. If there are any sutures visible, these will be removed 2-3 weeks after surgery in the orthopedic clinic. This appointment should be made, but if not please contact the orthopedic spine clinic at [phone] or request an appointment through [facility].     If you are wearing a cervical collar and your incision is on your neck, you can keep a dry dressing over the incision to keep it from being rubbed. However you should change the dressing every 1-2 days.     If the dressing becomes wet for any reason, such as with sweat or water, it should be changed.    After the dressing is removed, you may shower and allow water and soap to gently fall over the wound. After the shower, simply pat the wound dry. Do not apply and creams or lotions to  "you wound.     You wound should remain free of discharge or significant surrounding redness. If you notice any drainage or discharge, or it it develops significant  Redness, please contact us the clinic immediately at [phone] and ask for a nurse, or send a [place] message. After hours or weekends, you may go to the Orthopedic walk in clinic on [address], [place] from 7 am to 7pm daily.      Activity    We encourage you to be up and out of bed regularly. Please try to walk 30 minutes per day and increase this to several times a day as your pain allows.     You may wear the soft collar for comfort. It can be removed at any point if it is not helping with pain.    Avoid lifting over 10 pounds, avoid lifting anything overhead, and avoid repetitive bending or twisting.  This means no sporting activities. (such as running, tennis, bowling, golf, bicycling, etc.) until you are seen in clinic.     When to call - Contact Surgeon Team   Order Comments: You may experience symptoms that require follow-up before your scheduled appointment. Refer to the \"[employer]\" for instructions on when to contact your Surgeon Team if you are concerned about pain control, blood clots, constipation, or if you are unable to urinate.     When to call - Reach out to Urgent Care   Order Comments: If you are not able to reach your Surgeon Team and you need immediate care, go to the Orthopedic Walk-in Clinic or Urgent Care at your Surgeon's office.  Do NOT go to the Emergency Room unless you have shortness of breath, chest pain, or other signs of a medical emergency.     When to call - Reasons to Call 911   Order Comments: Call 911 immediately if you experience sudden-onset chest pain, arm weakness/numbness, slurred speech, or shortness of breath     Discharge Instruction - Breathing exercises   Order Comments: Perform breathing exercises using your Incentive Spirometer 10 times per hour while awake for 2 weeks.     Symptoms - " Fever Management   Order Comments: A low grade fever can be expected after surgery.  Use acetaminophen (TYLENOL) as needed for fever management.  Contact your Surgeon Team if you have a fever greater than 101.5 F, chills, and/or night sweats.     Symptoms - Constipation management   Order Comments: Constipation (hard, dry bowel movements) is expected after surgery due to the combination of being less active, the anesthetic, and the opioid pain medication.  You can do the following to help reduce constipation:  ~  FLUIDS:  Drink clear liquids (water or Gatorade), or juice (apple/prune).  ~  DIET:  Eat a fiber rich diet.    ~  ACTIVITY:  Get up and move around several times a day.  Increase your activity as you are able.  MEDICATIONS:  Reduce the risk of constipation by starting medications before you are constipated.  You can take Miralax   (1 packet as directed) and/or a stool softener (Senokot 1-2 tablets 1-2 times a day).  If you already have constipation and these medications are not working, you can get magnesium citrate and use as directed.  If you continue to have constipation you can try an over the counter suppository or enema.  Call your Surgeon Team if it has been greater than 3 days since your last bowel movement.     Symptoms - Reduced Urine Output   Order Comments: Changes in the amount of fluids you drank before and after surgery may result in problems urinating.  It is important to stay well-hydrated after surgery and drink plenty of water. If it has been greater than 8 hours since you have urinated despite drinking plenty of water, call your Surgeon Team.     Medication instructions - No pharmacologic VTE prophylaxis prescribed   Order Comments: Your Surgeon did not prescribe medication for anticoagulation.     Activity - Exercises to prevent blood clots   Order Comments: Unless otherwise directed by your Surgeon team, perform the following exercises at least three times per day for the first four  weeks after surgery to prevent blood clots in your legs: 1) Point and flex your feet (Ankle Pumps), 2) Move your ankle around in big circles, 3) Wiggle your toes, 4) Walk, even for short distances, several times a day, will help decrease the risk of blood clots.     Order Specific Question Answer Comments   Is discharge order? Yes      Comfort and Pain Management - Pain after Surgery   Order Comments: Pain after surgery is normal and expected.  You will have some amount of pain for several weeks after surgery.  Your pain will improve with time.  There are several things you can do to help reduce your pain including: rest, ice, elevation, and using pain medications as needed. Contact your Surgeon Team if you have pain that persists or worsens after surgery despite rest, ice, elevation, and taking your medication(s) as prescribed. Contact your Surgeon Team if you have new numbness, tingling, or weakness in your operative extremity.     Comfort and Pain Management - Cold therapy   Order Comments: Ice can be used to control swelling and discomfort after surgery. Place a thin towel over your operative site and apply the ice pack overtop. Leave ice pack in place for 20 minutes, then remove for 20 minutes. Repeat this 20 minutes on/20 minutes off routine as often as tolerated.     Medication Instructions - Muscle relaxant Medication Instructions   Order Comments: You were discharged with a muscle relaxer medication that can be used in conjunction with acetaminophen (TYLENOL) and the narcotic medication to manage pain symptoms. Take the muscle relaxant medication exactly as directed.     Medication Instructions - Alternate acetaminophen (TYLENOL) with NSAID Instructions   Order Comments: You can obtain acetaminophen (TYLENOL) and ibuprofen (ADVIL, MOTRIN) over the counter.  The most effective use of these medications is in a scheduled, alternating regimen.  Please start /continue these medications when you get home from the  hospital as follows:  Ibuprofen (ADVIL, MOTRIN)  400 mg every 4-6 hours  Acetaminophen (TYLENOL) 1000 mg every 8 hours.  Please alternate these medications so you are taking an over-the-counter pain medication every 4-6 hours to reduce pain, swelling, and opioid use.     Follow Up Care   Order Comments: Follow-up with your Surgeon Team in 6 weeks as scheduled     Discharge Instruction - Regular Diet Adult   Order Comments: Return to your pre-surgery diet unless instructed otherwise     Order Specific Question Answer Comments   Is discharge order? Yes            Bulmaro Castano MD PGY-4  Orthopaedic Surgery

## 2025-07-02 NOTE — PLAN OF CARE
Goal Outcome Evaluation:       Pt A&O x4, pleasant and cooperative with care. Able to make needs known. Denies CP, SOB, N/V. Pain managed with ice, robaxin and PO dilaudid. Pt to discharge home this afternoon. Hemovac drain removed this am. Cervical dressing removed and replaced with Aquacel dressing.   PIV removed. Dressing instructions reviewed and supplies given to patients. Pt. discharged at 1215 to home, and left with personal belongings. Pt. received complete discharge paperwork and  medications as filled by discharge pharmacy. Pt received and signed for the narcotic medication paperwork. Pt. was given times of last dose for all discharge medications in writing on discharge medication sheets. Discharge teaching included  medication, pain management, activity restrictions, dressing changes, and signs and symptoms of infection. Dressing supplies sent home. Pt. had no further questions at the time of discharge and no unmet needs were identified.

## 2025-07-28 ENCOUNTER — TELEPHONE (OUTPATIENT)
Dept: ORTHOPEDICS | Facility: CLINIC | Age: 70
End: 2025-07-28
Payer: COMMERCIAL

## 2025-07-28 NOTE — TELEPHONE ENCOUNTER
Other: Patient called and is wondering if he can do massage therapy for his on going pain. Please patient back with info.      Could we send this information to you in [place] or would you prefer to receive a phone call?:   Patient would prefer a phone call   Okay to leave a detailed message?: Yes at Cell number on file:    Telephone Information:   Mobile [phone]

## 2025-07-28 NOTE — TELEPHONE ENCOUNTER
"See phone message from pt.  Wondering if can do massage therapy.  DOS 6-20-25 Cervical Fusion.    I called pt back who rates neck pain 6-7 & stated has been off Dilaudid since 7-6-25 , taking Tylenol, Robaxin & Gabapentin from [facility].  Stated has \"knots\" in Trapezius area & wife  is a  & it helps the pain when she massages area.    I advised OK to do Gentle massage but not deep massage until after XR & eval at [facility] appt 8-14-25 & can ask  & pt agreed.  Also advised trying icing or heat prn & OTC patches or creams & pt agreed.  Call back prn.  Slime Tse RN.     "

## 2025-08-14 ENCOUNTER — OFFICE VISIT (OUTPATIENT)
Dept: ORTHOPEDICS | Facility: CLINIC | Age: 70
End: 2025-08-14
Payer: COMMERCIAL

## 2025-08-14 DIAGNOSIS — M54.2 NECK PAIN: Primary | ICD-10-CM

## 2025-08-14 DIAGNOSIS — Z98.1 S/P SPINAL FUSION: ICD-10-CM

## 2025-08-14 DIAGNOSIS — M48.02 FORAMINAL STENOSIS OF CERVICAL REGION: ICD-10-CM

## 2025-08-14 RX ORDER — TIZANIDINE 2 MG/1
2-4 TABLET ORAL 3 TIMES DAILY PRN
Qty: 60 TABLET | Refills: 0 | Status: SHIPPED | OUTPATIENT
Start: 2025-08-14

## (undated) DEVICE — SYR 03ML LL W/O NDL 309657

## (undated) DEVICE — SUCTION MANIFOLD NEPTUNE 2 SYS 4 PORT 0702-020-000

## (undated) DEVICE — CAST FIBERGLASS 4" ROLL WHITE 82004

## (undated) DEVICE — DRAIN RESERVOIR 100ML JP 0070740

## (undated) DEVICE — WIPES FOLEY CARE SURESTEP PROVON DFC100

## (undated) DEVICE — CLEANSER JET LAVAGE IRR 0.05% CHG ISEPT-450-USA

## (undated) DEVICE — SOLUTION WATER 1000ML BOTTLE R5000-01

## (undated) DEVICE — SOLUTION IRRIGATION 0.9% NACL 1000ML BOTTLE R5200-01

## (undated) DEVICE — GOWN XXLG REINFORCED 9071EL

## (undated) DEVICE — GLOVE PROTEXIS BLUE W/NEU-THERA 8.5  2D73EB85

## (undated) DEVICE — DRAPE IOBAN INCISE 23X17" 6650EZ

## (undated) DEVICE — BLADE SAW OSCILLATING STRYK MED 9.0X25X0.38MM 2296-003-111

## (undated) DEVICE — SU VICRYL 0 CT-1 27" J340H

## (undated) DEVICE — SU VICRYL 2-0 CP-2 27" UND J869H

## (undated) DEVICE — SOL NACL 0.9% IRRIG 1000ML BOTTLE 2F7124

## (undated) DEVICE — PREP DURAPREP 26ML APL 8630

## (undated) DEVICE — TOOL DISSECT MIDAS MR8 14CM MATCH HEAD 3MM MR8-14MH30

## (undated) DEVICE — PACK EXTREMITY SOP15EXFSD

## (undated) DEVICE — BLADE CLIPPER DISP 4406

## (undated) DEVICE — PROTECTOR ARM LG FOAM TRENDELENBURG TAP200

## (undated) DEVICE — COVER CAMERA IN-LIGHT DISP LT-C02

## (undated) DEVICE — DRAPE WITH CLEAR WINDOW CERVICAL LEVO 7894-6

## (undated) DEVICE — CAST PADDING 4" UNSTERILE 9044

## (undated) DEVICE — STPL SKIN 35W ROTATING HEAD PRW35

## (undated) DEVICE — LABEL MEDICATION SYSTEM 3303-P

## (undated) DEVICE — Device

## (undated) DEVICE — SU DERMABOND ADVANCED .7ML DNX12

## (undated) DEVICE — IMM LEG ELEVATOR 79-90191

## (undated) DEVICE — DRSG PRIMAPORE 03 1/8X6" 66000318

## (undated) DEVICE — POSITIONER ARMBOARD FOAM CONVOLUTE CP-501

## (undated) DEVICE — SU MONOCRYL 3-0 SH 27" UND Y416H

## (undated) DEVICE — KIT DRAIN CLOSED WOUND SUCTION MED 400ML RESVR

## (undated) DEVICE — RX SURGIFLO HEMOSTATIC MATRIX W/THROMBIN 8ML 2994

## (undated) DEVICE — DRAPE C-ARMOR 5 SIDED 5523

## (undated) DEVICE — SUTURE MONOCRYL 3-0 18 PS2 UND MCP497G

## (undated) DEVICE — IMM COLLAR CERVICAL MED UNIVERSAL 2.5X24" 79-83520

## (undated) DEVICE — OINTMENT ANTIBIOTIC BACITRACIN ZINC .9 G 1171

## (undated) DEVICE — IMM LIMB ELEVATOR DC40-0203

## (undated) DEVICE — PERFORATOR BONE FENESTRATION P99-100-2009

## (undated) DEVICE — BNDG ELASTIC 4"X5YDS UNSTERILE 6611-40

## (undated) DEVICE — MANIFOLD NEPTUNE 4 PORT 700-20

## (undated) DEVICE — LINEN TOWEL PACK X5 5464

## (undated) DEVICE — SUTURE VICRYL+ 1 CT-1 CR 8X18" VIO VCP741D

## (undated) DEVICE — GLOVE PROTEXIS MICRO 8.0 LT BLUE 2D73PM80

## (undated) DEVICE — MARKER SPHERES PASSIVE MEDT PACK 5 8801075

## (undated) DEVICE — DRSG TEGADERM 4X4 3/4" 1626W

## (undated) DEVICE — SOL ISOPROPYL RUBBING ALCOHOL USP 70% 4OZ HDX-20 I0020

## (undated) DEVICE — ADH LIQUID MASTISOL TOPICAL VIAL 2-3ML 0523-48

## (undated) DEVICE — BLADE KNIFE SURG 15 371115

## (undated) DEVICE — MARKING PEN REG/FINE DUALT TIP WITH RULER 1437SR-100

## (undated) DEVICE — SOL WATER IRRIG 1000ML BOTTLE 2F7114

## (undated) DEVICE — DRAIN JACKSON PRATT 07FR ROUND SU130-1320

## (undated) DEVICE — CATH TRAY FOLEY SURESTEP 16FR WDRAIN BAG STLK LATEX A300316A

## (undated) DEVICE — SU MONOCRYL 3-0 PS-2 27" Y427H

## (undated) DEVICE — LINEN BACK PACK 5440

## (undated) DEVICE — PACK SET-UP STD 9102

## (undated) DEVICE — SUTURE VICRYL+ 2-0 CT-2 CR 8X18" VCP726D

## (undated) DEVICE — TOURNIQUET SGL  BLADDER 30"X4" BLUE 5921030135

## (undated) DEVICE — CAST PADDING 4" STERILE 9044S

## (undated) DEVICE — KIT POSITIONING 4 POSTER CERVICAL LEVO 7890-1

## (undated) DEVICE — DRSG ABDOMINAL 07 1/2X8" 7197D

## (undated) DEVICE — TUBING SUCTION MEDI-VAC SOFT 3/16"X20' N520A

## (undated) DEVICE — PREP CHLORAPREP 26ML TINTED HI-LITE ORANGE 930815

## (undated) DEVICE — SU ETHILON 3-0 FS-1 18" 669H

## (undated) DEVICE — SU VICRYL 2-0 CT-1 27" UND J259H

## (undated) DEVICE — DRAPE O ARM TUBE 9732722

## (undated) DEVICE — PIN SKULL MAYFIELD ADULT TITANIUM 3/PK A1120

## (undated) RX ORDER — PROPOFOL 10 MG/ML
INJECTION, EMULSION INTRAVENOUS
Status: DISPENSED
Start: 2022-11-28

## (undated) RX ORDER — FENTANYL CITRATE 50 UG/ML
INJECTION, SOLUTION INTRAMUSCULAR; INTRAVENOUS
Status: DISPENSED
Start: 2025-06-30

## (undated) RX ORDER — METHOCARBAMOL 750 MG/1
TABLET, FILM COATED ORAL
Status: DISPENSED
Start: 2025-06-30

## (undated) RX ORDER — ONDANSETRON 2 MG/ML
INJECTION INTRAMUSCULAR; INTRAVENOUS
Status: DISPENSED
Start: 2025-06-30

## (undated) RX ORDER — HYDROMORPHONE HYDROCHLORIDE 1 MG/ML
INJECTION, SOLUTION INTRAMUSCULAR; INTRAVENOUS; SUBCUTANEOUS
Status: DISPENSED
Start: 2025-06-30

## (undated) RX ORDER — HYDROCODONE BITARTRATE AND ACETAMINOPHEN 5; 325 MG/1; MG/1
TABLET ORAL
Status: DISPENSED
Start: 2022-11-28

## (undated) RX ORDER — DEXAMETHASONE SODIUM PHOSPHATE 4 MG/ML
INJECTION, SOLUTION INTRA-ARTICULAR; INTRALESIONAL; INTRAMUSCULAR; INTRAVENOUS; SOFT TISSUE
Status: DISPENSED
Start: 2022-11-28

## (undated) RX ORDER — EPHEDRINE SULFATE 50 MG/ML
INJECTION, SOLUTION INTRAMUSCULAR; INTRAVENOUS; SUBCUTANEOUS
Status: DISPENSED
Start: 2025-06-30

## (undated) RX ORDER — FENTANYL CITRATE 0.05 MG/ML
INJECTION, SOLUTION INTRAMUSCULAR; INTRAVENOUS
Status: DISPENSED
Start: 2022-11-28

## (undated) RX ORDER — DEXAMETHASONE SODIUM PHOSPHATE 4 MG/ML
INJECTION, SOLUTION INTRA-ARTICULAR; INTRALESIONAL; INTRAMUSCULAR; INTRAVENOUS; SOFT TISSUE
Status: DISPENSED
Start: 2025-06-30

## (undated) RX ORDER — ONDANSETRON 2 MG/ML
INJECTION INTRAMUSCULAR; INTRAVENOUS
Status: DISPENSED
Start: 2022-11-28

## (undated) RX ORDER — CEFAZOLIN SODIUM/WATER 2 G/20 ML
SYRINGE (ML) INTRAVENOUS
Status: DISPENSED
Start: 2022-11-28

## (undated) RX ORDER — CEFAZOLIN SODIUM/WATER 2 G/20 ML
SYRINGE (ML) INTRAVENOUS
Status: DISPENSED
Start: 2025-06-30

## (undated) RX ORDER — EPHEDRINE SULFATE 50 MG/ML
INJECTION, SOLUTION INTRAMUSCULAR; INTRAVENOUS; SUBCUTANEOUS
Status: DISPENSED
Start: 2022-11-28

## (undated) RX ORDER — FENTANYL CITRATE-0.9 % NACL/PF 10 MCG/ML
PLASTIC BAG, INJECTION (ML) INTRAVENOUS
Status: DISPENSED
Start: 2025-06-30

## (undated) RX ORDER — PROPOFOL 10 MG/ML
INJECTION, EMULSION INTRAVENOUS
Status: DISPENSED
Start: 2025-06-30

## (undated) RX ORDER — GABAPENTIN 100 MG/1
CAPSULE ORAL
Status: DISPENSED
Start: 2025-06-30

## (undated) RX ORDER — VANCOMYCIN HYDROCHLORIDE 1 G/20ML
INJECTION, POWDER, LYOPHILIZED, FOR SOLUTION INTRAVENOUS
Status: DISPENSED
Start: 2025-06-30

## (undated) RX ORDER — GLYCOPYRROLATE 0.2 MG/ML
INJECTION, SOLUTION INTRAMUSCULAR; INTRAVENOUS
Status: DISPENSED
Start: 2022-11-28

## (undated) RX ORDER — FENTANYL CITRATE 50 UG/ML
INJECTION, SOLUTION INTRAMUSCULAR; INTRAVENOUS
Status: DISPENSED
Start: 2022-11-28

## (undated) RX ORDER — ACETAMINOPHEN 325 MG/1
TABLET ORAL
Status: DISPENSED
Start: 2025-06-30